# Patient Record
Sex: FEMALE | Race: WHITE | Employment: FULL TIME | URBAN - METROPOLITAN AREA
[De-identification: names, ages, dates, MRNs, and addresses within clinical notes are randomized per-mention and may not be internally consistent; named-entity substitution may affect disease eponyms.]

---

## 2017-02-03 ENCOUNTER — OFFICE VISIT (OUTPATIENT)
Dept: FAMILY MEDICINE CLINIC | Age: 47
End: 2017-02-03

## 2017-02-03 VITALS
WEIGHT: 157.6 LBS | OXYGEN SATURATION: 96 % | RESPIRATION RATE: 17 BRPM | DIASTOLIC BLOOD PRESSURE: 100 MMHG | HEART RATE: 66 BPM | BODY MASS INDEX: 26.91 KG/M2 | TEMPERATURE: 98.5 F | SYSTOLIC BLOOD PRESSURE: 130 MMHG | HEIGHT: 64 IN

## 2017-02-03 DIAGNOSIS — I26.99 OTHER PULMONARY EMBOLISM WITHOUT ACUTE COR PULMONALE, UNSPECIFIED CHRONICITY (HCC): ICD-10-CM

## 2017-02-03 DIAGNOSIS — J40 BRONCHITIS: Primary | ICD-10-CM

## 2017-02-03 DIAGNOSIS — R04.2 BLOOD IN SPUTUM: ICD-10-CM

## 2017-02-03 LAB
INR BLD: 3.1
PT POC: 38.3 SEC
VALID INTERNAL CONTROL?: YES

## 2017-02-03 RX ORDER — LORAZEPAM 1 MG/1
TABLET ORAL
Refills: 5 | COMMUNITY
Start: 2017-01-02 | End: 2017-05-15 | Stop reason: ALTCHOICE

## 2017-02-03 RX ORDER — PREDNISONE 10 MG/1
TABLET ORAL
Qty: 1 PACKAGE | Refills: 0 | Status: SHIPPED | OUTPATIENT
Start: 2017-02-03 | End: 2018-05-30 | Stop reason: ALTCHOICE

## 2017-02-03 RX ORDER — FLUCONAZOLE 150 MG/1
150 TABLET ORAL DAILY
Qty: 2 TAB | Refills: 0 | Status: SHIPPED | OUTPATIENT
Start: 2017-02-03 | End: 2017-02-04

## 2017-02-03 RX ORDER — CEFDINIR 300 MG/1
300 CAPSULE ORAL 2 TIMES DAILY
Qty: 20 CAP | Refills: 0 | Status: SHIPPED | OUTPATIENT
Start: 2017-02-03 | End: 2017-02-13

## 2017-02-03 NOTE — MR AVS SNAPSHOT
Visit Information Date & Time Provider Department Dept. Phone Encounter #  
 2/3/2017  2:00 PM Elizabeth Dickens NP [address] [facility] [phone] [id number] Follow-up Instructions Return if symptoms worsen or fail to improve. Upcoming Health Maintenance Date Due DTaP/Tdap/Td series (1 - Tdap) 6/9/1991 PAP AKA CERVICAL CYTOLOGY 6/9/1991 INFLUENZA AGE 9 TO ADULT 8/1/2016 Allergies as of 2/3/2017  Review Complete On: 2/3/2017 By: Elizabeth Dickens NP Severity Noted Reaction Type Reactions Tetanus Immune Globulin  10/08/2013    Swelling Current Immunizations  Never Reviewed No immunizations on file. Not reviewed this visit You Were Diagnosed With   
  
 Codes Comments Bronchitis    -  Primary ICD-10-CM: C36 ICD-9-CM: 603 Other pulmonary embolism without acute cor pulmonale, unspecified chronicity (HCC)     ICD-10-CM: I26.99 
ICD-9-CM: 415.19 Blood in sputum     ICD-10-CM: R04.2 ICD-9-CM: 786.30 Vitals BP Pulse Temp Resp Height(growth percentile) Weight(growth percentile) (!) 130/100 66 98.5 °F (36.9 °C) (Oral) 17 5' 4\" (1.626 m) 157 lb 9.6 oz (71.5 kg) SpO2 BMI OB Status Smoking Status 96% 27.05 kg/m2 Ablation Never Smoker Vitals History BMI and BSA Data Body Mass Index Body Surface Area  
 27.05 kg/m 2 1.8 m 2 Preferred Pharmacy Pharmacy Name Phone Scot Moran [address] [facility] [address] [facility] [phone] Your Updated Medication List  
  
   
This list is accurate as of: 2/3/17  2:32 PM.  Always use your most recent med list.  
  
  
  
  
 * albuterol sulfate 2.5 mg/0.5 mL Nebu nebulizer solution Commonly known as:  PROVENTIL;VENTOLIN  
0.5 mL by Nebulization route every four (4) hours as needed for Wheezing. * albuterol 90 mcg/actuation inhaler Commonly known as:  PROVENTIL HFA, VENTOLIN HFA, PROAIR HFA Take 2 Puffs by inhalation every four (4) hours as needed for Wheezing or Shortness of Breath. ALPRAZolam 0.5 mg tablet Commonly known as:  XANAX  
TAKE ONE TABLET BY MOUTH DAILY AS NEEDED FOR ANXIETY. Must last 30 days  
  
 budesonide-formoterol 160-4.5 mcg/actuation HFA inhaler Commonly known as:  SYMBICORT Take 2 Puffs by inhalation two (2) times a day. cefdinir 300 mg capsule Commonly known as:  OMNICEF Take 1 Cap by mouth two (2) times a day for 10 days. eszopiclone 3 mg tablet Commonly known as:  Rain Bliss Take 1 Tab by mouth nightly. Max Daily Amount: 3 mg. fluconazole 150 mg tablet Commonly known as:  DIFLUCAN Take 1 Tab by mouth daily for 1 day. May repeat in 3 days if needed  
  
 gabapentin 300 mg capsule Commonly known as:  NEURONTIN Take 300 mg by mouth daily. lisinopril 20 mg tablet Commonly known as:  Allyson Harry Take 1 Tab by mouth daily. LORazepam 1 mg tablet Commonly known as:  ATIVAN TK 1 T PO BID PRA  
  
 methadone 10 mg tablet Commonly known as:  DOLOPHINE Take 5.5 Tabs by mouth daily. Max Daily Amount: 55 mg.  
  
 omeprazole 40 mg capsule Commonly known as:  PRILOSEC Take 1 Cap by mouth daily. ondansetron 4 mg disintegrating tablet Commonly known as:  ZOFRAN ODT Take 1 Tab by mouth every eight (8) hours as needed for Nausea. predniSONE 10 mg dose pack Commonly known as:  STERAPRED DS Use as directed on packaging x 6 days * warfarin 10 mg tablet Commonly known as:  COUMADIN Take 1 Tab by mouth daily. * warfarin 5 mg tablet Commonly known as:  COUMADIN Take 1 Tab by mouth daily. * Notice: This list has 4 medication(s) that are the same as other medications prescribed for you. Read the directions carefully, and ask your doctor or other care provider to review them with you. Prescriptions Sent to Pharmacy Refills  
 cefdinir (OMNICEF) 300 mg capsule 0  Sig: Take 1 Cap by mouth two (2) times a day for 10 days. Class: Normal  
 Pharmacy: [facility] [address], [address] Ph #: [phone] Route: Oral  
 predniSONE (STERAPRED DS) 10 mg dose pack 0 Sig: Use as directed on packaging x 6 days Class: Normal  
 Pharmacy: [facility] [address], [address] Ph #: [phone]  
 fluconazole (DIFLUCAN) 150 mg tablet 0 Sig: Take 1 Tab by mouth daily for 1 day. May repeat in 3 days if needed Class: Normal  
 Pharmacy: [facility] [address], [address] Ph #: [phone] Route: Oral  
  
Follow-up Instructions Return if symptoms worsen or fail to improve. Patient Instructions Bronchitis: Care Instructions Your Care Instructions Bronchitis is inflammation of the bronchial tubes, which carry air to the lungs. The tubes swell and produce mucus, or phlegm. The mucus and inflamed bronchial tubes make you cough. You may have trouble breathing. Most cases of bronchitis are caused by viruses like those that cause colds. Antibiotics usually do not help and they may be harmful. Bronchitis usually develops rapidly and lasts about 2 to 3 weeks in otherwise healthy people. Follow-up care is a key part of your treatment and safety. Be sure to make and go to all appointments, and call your doctor if you are having problems. It's also a good idea to know your test results and keep a list of the medicines you take. How can you care for yourself at home? · Take all medicines exactly as prescribed. Call your doctor if you think you are having a problem with your medicine. · Get some extra rest. 
· Take an over-the-counter pain medicine, such as acetaminophen (Tylenol), ibuprofen (Advil, Motrin), or naproxen (Aleve) to reduce fever and relieve body aches. Read and follow all instructions on the label.  
· Do not take two or more pain medicines at the same time unless the doctor told you to. Many pain medicines have acetaminophen, which is Tylenol. Too much acetaminophen (Tylenol) can be harmful. · Take an over-the-counter cough medicine that contains dextromethorphan to help quiet a dry, hacking cough so that you can sleep. Avoid cough medicines that have more than one active ingredient. Read and follow all instructions on the label. · Breathe moist air from a humidifier, hot shower, or sink filled with hot water. The heat and moisture will thin mucus so you can cough it out. · Do not smoke. Smoking can make bronchitis worse. If you need help quitting, talk to your doctor about stop-smoking programs and medicines. These can increase your chances of quitting for good. When should you call for help? Call 911 anytime you think you may need emergency care. For example, call if: 
· You have severe trouble breathing. Call your doctor now or seek immediate medical care if: 
· You have new or worse trouble breathing. · You cough up dark brown or bloody mucus (sputum). · You have a new or higher fever. · You have a new rash. Watch closely for changes in your health, and be sure to contact your doctor if: 
· You cough more deeply or more often, especially if you notice more mucus or a change in the color of your mucus. · You are not getting better as expected. Where can you learn more? Go to http://javi-lubna.info/. Enter H333 in the search box to learn more about \"Bronchitis: Care Instructions. \" Current as of: May 23, 2016 Content Version: 11.1 © 3541-1160 Healthwise, Incorporated. Care instructions adapted under license by [employer] (which disclaims liability or warranty for this information). If you have questions about a medical condition or this instruction, always ask your healthcare professional. [place] [address] any warranty or liability for your use of this information. Introducing [facility]!    
 Teresa Ventura introduces MobileSnack patient portal. Now you can access parts of your medical record, email your doctor's office, and request medication refills online. 1. In your internet browser, go to [email or web address] 2. Click on the First Time User? Click Here link in the Sign In box. You will see the New Member Sign Up page. 3. Enter your MobileSnack Access Code exactly as it appears below. You will not need to use this code after youve completed the sign-up process. If you do not sign up before the expiration date, you must request a new code. · MobileSnack Access Code: [email or web address] Expires: 3/8/2017 10:08 AM 
 
4. Enter the last four digits of your Social Security Number (xxxx) and Date of Birth (mm/dd/yyyy) as indicated and click Submit. You will be taken to the next sign-up page. 5. Create a MobileSnack ID. This will be your MobileSnack login ID and cannot be changed, so think of one that is secure and easy to remember. 6. Create a MobileSnack password. You can change your password at any time. 7. Enter your Password Reset Question and Answer. This can be used at a later time if you forget your password. 8. Enter your e-mail address. You will receive e-mail notification when new information is available in [address] 9. Click Sign Up. You can now view and download portions of your medical record. 10. Click the Download Summary menu link to download a portable copy of your medical information. If you have questions, please visit the Frequently Asked Questions section of the MobileSnack website. Remember, MobileSnack is NOT to be used for urgent needs. For medical emergencies, dial 911. Now available from your iPhone and Android! Please provide this summary of care documentation to your next provider. Your primary care clinician is listed as Albert Paris. If you have any questions after today's visit, please call [phone].

## 2017-02-03 NOTE — PROGRESS NOTES
Chief Complaint   Patient presents with    Cough     Blood tinged mucus     Sore Throat     Scratchy    Nasal Congestion     QHS     Patient seen in office today c/o of severe nasal congestion at [facility].    She reports having a scratchy throat for apr 3 wks  Patient concerned with constantly coughing up moderate amts of blood tinged mucus frequently  Has not tried OTC rx, states she is unable, related to current disease, left unspecified

## 2017-02-03 NOTE — PATIENT INSTRUCTIONS
Bronchitis: Care Instructions  Your Care Instructions    Bronchitis is inflammation of the bronchial tubes, which carry air to the lungs. The tubes swell and produce mucus, or phlegm. The mucus and inflamed bronchial tubes make you cough. You may have trouble breathing. Most cases of bronchitis are caused by viruses like those that cause colds. Antibiotics usually do not help and they may be harmful. Bronchitis usually develops rapidly and lasts about 2 to 3 weeks in otherwise healthy people. Follow-up care is a key part of your treatment and safety. Be sure to make and go to all appointments, and call your doctor if you are having problems. It's also a good idea to know your test results and keep a list of the medicines you take. How can you care for yourself at home? · Take all medicines exactly as prescribed. Call your doctor if you think you are having a problem with your medicine. · Get some extra rest.  · Take an over-the-counter pain medicine, such as acetaminophen (Tylenol), ibuprofen (Advil, Motrin), or naproxen (Aleve) to reduce fever and relieve body aches. Read and follow all instructions on the label. · Do not take two or more pain medicines at the same time unless the doctor told you to. Many pain medicines have acetaminophen, which is Tylenol. Too much acetaminophen (Tylenol) can be harmful. · Take an over-the-counter cough medicine that contains dextromethorphan to help quiet a dry, hacking cough so that you can sleep. Avoid cough medicines that have more than one active ingredient. Read and follow all instructions on the label. · Breathe moist air from a humidifier, hot shower, or sink filled with hot water. The heat and moisture will thin mucus so you can cough it out. · Do not smoke. Smoking can make bronchitis worse. If you need help quitting, talk to your doctor about stop-smoking programs and medicines. These can increase your chances of quitting for good.   When should you call for help? Call 911 anytime you think you may need emergency care. For example, call if:  · You have severe trouble breathing. Call your doctor now or seek immediate medical care if:  · You have new or worse trouble breathing. · You cough up dark brown or bloody mucus (sputum). · You have a new or higher fever. · You have a new rash. Watch closely for changes in your health, and be sure to contact your doctor if:  · You cough more deeply or more often, especially if you notice more mucus or a change in the color of your mucus. · You are not getting better as expected. Where can you learn more? Go to http://javi-lubna.info/. Enter H333 in the search box to learn more about \"Bronchitis: Care Instructions. \"  Current as of: May 23, 2016  Content Version: 11.1  © 8649-8998 [employer], Incorporated. Care instructions adapted under license by [employer] (which disclaims liability or warranty for this information). If you have questions about a medical condition or this instruction, always ask your healthcare professional. [address] any warranty or liability for your use of this information.

## 2017-02-03 NOTE — PROGRESS NOTES
Chief Complaint   Patient presents with    Cough     Blood tinged mucus     Sore Throat     Scratchy    Nasal Congestion     QHS     she is a 55y.o. year old female who presents for evalution. Pt states for past few weeks has been having congestion, sore throat. Last night started having coughing with bloody mucus. No fever or chills. Has not tried any OTCs aside from Benadryl at bedtime. No chest pain, no SOB, no wheezing. Pt is on Coumadin, takes regularly. Last PT/INR check in early Dec.     Pt states when takes Lisinopril and methadone will feel very dizzy and like BP is too low. Has not taken BP medication in past week. Does not monitor BP at home. Reviewed PmHx, RxHx, FmHx, SocHx, AllgHx and updated and dated in the chart. Review of Systems - negative except as listed above in the HPI    Objective:     Vitals:    02/03/17 1349 02/03/17 1424 02/03/17 1425   BP: (!) 98/95 (!) 130/100 (!) 130/100   Pulse: 66     Resp: 17     Temp: 98.5 °F (36.9 °C)     TempSrc: Oral     SpO2: 96%     Weight: 157 lb 9.6 oz (71.5 kg)     Height: 5' 4\" (1.626 m)       Physical Examination: General appearance - alert, well appearing, and in no distress  Ears - bilateral TM's and external ear canals normal  Nose - normal nontender sinuses, mucosal congestion and mucosal erythema  Mouth - mucous membranes moist, pharynx normal without lesions and erythematous  Neck - supple, no significant adenopathy  Chest - clear to auscultation, no wheezes, rales or rhonchi, symmetric air entry, coarse breath sounds   Heart - normal rate, regular rhythm, normal S1, S2, no murmurs, rubs, clicks or gallops    Assessment/ Plan:   Brooke Rogers was seen today for cough, sore throat and nasal congestion. Diagnoses and all orders for this visit:    Bronchitis  -     cefdinir (OMNICEF) 300 mg capsule; Take 1 Cap by mouth two (2) times a day for 10 days.   -     predniSONE (STERAPRED DS) 10 mg dose pack; Use as directed on packaging x 6 days  -     fluconazole (DIFLUCAN) 150 mg tablet; Take 1 Tab by mouth daily for 1 day. May repeat in 3 days if needed  New rxs. Discussed and encouraged rest and clear fluids, if congestion is thick should avoid dairy. Recommended hot showers with steam and elevating head of bed. May use Vitamin C or Echinacea in addition to current therapy. Other pulmonary embolism without acute cor pulmonale, unspecified chronicity (HCC)  -     AMB POC PT/INR  Slightly above goal, with additional bleeding in sputum take 5mg today instead of normal 10mg dose. Blood in sputum  -     AMB POC PT/INR    Pt voiced understanding regarding plan of care. Follow-up Disposition:  Return if symptoms worsen or fail to improve. I have discussed the diagnosis with the patient and the intended plan as seen in the above orders. The patient has received an after-visit summary and questions were answered concerning future plans.      Medication Side Effects and Warnings were discussed with patient    Ciara Weiner NP

## 2017-03-10 ENCOUNTER — TELEPHONE (OUTPATIENT)
Dept: FAMILY MEDICINE CLINIC | Age: 47
End: 2017-03-10

## 2017-03-10 NOTE — TELEPHONE ENCOUNTER
----- Message from Antoine Sol sent at 3/10/2017  7:40 AM EST -----  Regarding: Dr. Pola Rape Rhumel[facility]) would like a call from the doctor concerning pt's medication(Xanax) that has been prescribed by the doctor for several mths. Best contact number [phone].

## 2017-03-10 NOTE — TELEPHONE ENCOUNTER
Call from pt's addiction counselor pt has reported to them that she is filling but not taking any of her controlled substances, \"just doesn't want them to stop being prescribed\". Pt is getting ativan and pain medication from another doctor. I recommend discontinuing prescribing controlled substances until follow up.

## 2017-03-16 ENCOUNTER — DOCUMENTATION ONLY (OUTPATIENT)
Dept: FAMILY MEDICINE CLINIC | Age: 47
End: 2017-03-16

## 2017-05-03 ENCOUNTER — OFFICE VISIT (OUTPATIENT)
Dept: FAMILY MEDICINE CLINIC | Age: 47
End: 2017-05-03

## 2017-05-03 ENCOUNTER — HOSPITAL ENCOUNTER (OUTPATIENT)
Dept: LAB | Age: 47
Discharge: HOME | End: 2017-05-03
Payer: COMMERCIAL

## 2017-05-03 VITALS
HEIGHT: 64 IN | WEIGHT: 142 LBS | SYSTOLIC BLOOD PRESSURE: 130 MMHG | TEMPERATURE: 98.4 F | DIASTOLIC BLOOD PRESSURE: 98 MMHG | HEART RATE: 78 BPM | RESPIRATION RATE: 16 BRPM | OXYGEN SATURATION: 99 % | BODY MASS INDEX: 24.24 KG/M2

## 2017-05-03 DIAGNOSIS — N83.202 CYSTS OF BOTH OVARIES: ICD-10-CM

## 2017-05-03 DIAGNOSIS — N83.201 CYSTS OF BOTH OVARIES: ICD-10-CM

## 2017-05-03 DIAGNOSIS — K59.09 CHRONIC CONSTIPATION: ICD-10-CM

## 2017-05-03 DIAGNOSIS — Z12.31 VISIT FOR SCREENING MAMMOGRAM: ICD-10-CM

## 2017-05-03 DIAGNOSIS — Z00.00 WELL ADULT EXAM: Primary | ICD-10-CM

## 2017-05-03 DIAGNOSIS — Z01.419 ENCOUNTER FOR CERVICAL PAP SMEAR WITH PELVIC EXAM: ICD-10-CM

## 2017-05-03 DIAGNOSIS — Z80.3 FH: BREAST CANCER: ICD-10-CM

## 2017-05-03 PROCEDURE — 87625 HPV TYPES 16 & 18 ONLY: CPT | Performed by: NURSE PRACTITIONER

## 2017-05-03 PROCEDURE — 88175 CYTOPATH C/V AUTO FLUID REDO: CPT | Performed by: NURSE PRACTITIONER

## 2017-05-03 PROCEDURE — 87624 HPV HI-RISK TYP POOLED RSLT: CPT | Performed by: NURSE PRACTITIONER

## 2017-05-03 RX ORDER — HYDROCODONE BITARTRATE AND ACETAMINOPHEN 10; 325 MG/1; MG/1
TABLET ORAL
Refills: 0 | COMMUNITY
Start: 2017-02-22 | End: 2017-05-15 | Stop reason: ALTCHOICE

## 2017-05-03 NOTE — PROGRESS NOTES
1. Have you been to the ER, urgent care clinic since your last visit? Hospitalized since your last visit? No    2. Have you seen or consulted any other health care providers outside of the [address] [place] [address] since your last visit? Include any pap smears or colon screening.  No    Chief Complaint   Patient presents with    Complete Physical     w/ PAP    Labs     fasting

## 2017-05-03 NOTE — LETTER
5/5/2017 10:56 AM 
 
Ms. Lizama  [address] [address] [place] [address] Dear Alda Pro: **We have not been able to reach you by phone because the number we have on file [phone] is not in service. Please see recommendations at the bottom of this letter and call the office at [phone] to schedule an appointment to recheck labs. ** 
 
Resulted Orders CBC WITH AUTOMATED DIFF Result Value Ref Range WBC 11.9 (H) 3.4 - 10.8 x10E3/uL  
 RBC 4.61 3.77 - 5.28 x10E6/uL HGB 13.1 11.1 - 15.9 g/dL HCT 41.0 34.0 - 46.6 % MCV 89 79 - 97 fL  
 MCH 28.4 26.6 - 33.0 pg  
 MCHC 32.0 31.5 - 35.7 g/dL  
 RDW 14.7 12.3 - 15.4 % PLATELET 337 903 - 854 x10E3/uL NEUTROPHILS 70 % Lymphocytes 20 % MONOCYTES 8 % EOSINOPHILS 2 % BASOPHILS 0 %  
 ABS. NEUTROPHILS 8.2 (H) 1.4 - 7.0 x10E3/uL Abs Lymphocytes 2.4 0.7 - 3.1 x10E3/uL  
 ABS. MONOCYTES 1.0 (H) 0.1 - 0.9 x10E3/uL  
 ABS. EOSINOPHILS 0.3 0.0 - 0.4 x10E3/uL  
 ABS. BASOPHILS 0.0 0.0 - 0.2 x10E3/uL IMMATURE GRANULOCYTES 0 %  
 ABS. IMM. GRANS. 0.0 0.0 - 0.1 x10E3/uL Narrative Performed at:  [address]  [id number] : Linder Lanes MD, Phone:  [phone] LIPID PANEL Result Value Ref Range Cholesterol, total 179 100 - 199 mg/dL Triglyceride 168 (H) 0 - 149 mg/dL HDL Cholesterol 41 >39 mg/dL VLDL, calculated 34 5 - 40 mg/dL LDL, calculated 104 (H) 0 - 99 mg/dL Narrative Performed at:  [address]  [id number] : Linder Lanes MD, Phone:  [phone] TSH 3RD GENERATION Result Value Ref Range TSH 1.370 0.450 - 4.500 uIU/mL Narrative Performed at:  [address]  [id number] : Linder Lanes MD, Phone:  [phone] METABOLIC PANEL, COMPREHENSIVE Result Value Ref Range Glucose 80 65 - 99 mg/dL  BUN 3 (L) 6 - 24 mg/dL Creatinine 0.60 0.57 - 1.00 mg/dL GFR est non- >59 mL/min/1.73 GFR est  >59 mL/min/1.73  
 BUN/Creatinine ratio 5 (L) 9 - 23 Sodium 141 134 - 144 mmol/L Potassium 3.2 (L) 3.5 - 5.2 mmol/L Chloride 99 96 - 106 mmol/L  
 CO2 26 18 - 29 mmol/L Calcium 8.8 8.7 - 10.2 mg/dL Protein, total 6.4 6.0 - 8.5 g/dL Albumin 4.0 3.5 - 5.5 g/dL GLOBULIN, TOTAL 2.4 1.5 - 4.5 g/dL A-G Ratio 1.7 1.2 - 2.2 Bilirubin, total 0.4 0.0 - 1.2 mg/dL Alk. phosphatase 124 (H) 39 - 117 IU/L  
 AST (SGOT) 26 0 - 40 IU/L  
 ALT (SGPT) 23 0 - 32 IU/L Narrative Performed at:  [address]  [id number] : Jurgen Hayes MD, Phone:  [phone] CVD REPORT Result Value Ref Range INTERPRETATION Note Comment:  
   Supplement report is available. Narrative Performed at:  [address] [place] [address] [address] [place] [address]  [id number] : Nettie Gunderson PhD, Phone:  [phone] RECOMMENDATIONS: 
Please let her know labs overall look great but her potassium is low.  Needs to add an over the counter potassium supplement once a day to get this level up. Otherwise no changes in meds at this time, recheck potassium in 1 month. Nestor Puga Please call me if you have any questions: [phone] Sincerely, 
 
 
Christine Shaver NP

## 2017-05-03 NOTE — PROGRESS NOTES
Subjective:   55 y.o. female for Well Woman Check. Patient's last menstrual period was 07/01/2010. Social History: single partner, contraception - none. Pertinent past medical hstory: no history of HTN, DVT, CAD, DM, liver disease, migraines or smoking. She would like to see breast specialists: 6 aunts and an uncle and her mother all had breast or cervical cancer. Patient Active Problem List    Diagnosis Date Noted    Carotid artery stenosis 07/27/2015    Takayasu's disease ([facility] 07/10/2015    Aneurysm of other specified artery 07/10/2015    [facility] ([facility] 07/10/2015    Peripheral neuropathy ([facility] 07/10/2015    Bipolar I disorder, most recent episode (or current) unspecified 07/10/2015    Gastroesophageal reflux disease without esophagitis 07/10/2015    Benign essential hypertension 07/10/2015    Insomnia 07/10/2015     Current Outpatient Prescriptions   Medication Sig Dispense Refill    HYDROcodone-acetaminophen (NORCO)  mg tablet TAKE 1 TABLET BY MOUTH EVERY 4 HOURS AS NEEDED FOR PAIN  0    linaclotide (LINZESS) 145 mcg cap capsule Take 1 Cap by mouth Daily (before breakfast). 30 Cap 5    LORazepam (ATIVAN) 1 mg tablet TK 1 T PO BID PRA  5    warfarin (COUMADIN) 10 mg tablet Take 1 Tab by mouth daily. 30 Tab 5    warfarin (COUMADIN) 5 mg tablet Take 1 Tab by mouth daily. 30 Tab 5    lisinopril (PRINIVIL, ZESTRIL) 20 mg tablet Take 1 Tab by mouth daily. 30 Tab 5    omeprazole (PRILOSEC) 40 mg capsule Take 1 Cap by mouth daily. 30 Cap 5    ALPRAZolam (XANAX) 0.5 mg tablet TAKE ONE TABLET BY MOUTH DAILY AS NEEDED FOR ANXIETY. Must last 30 days 30 Tab 2    ondansetron (ZOFRAN ODT) 4 mg disintegrating tablet Take 1 Tab by mouth every eight (8) hours as needed for Nausea. 20 Tab 2    methadone (DOLOPHINE) 10 mg tablet Take 5.5 Tabs by mouth daily. Max Daily Amount: 55 mg.  0    eszopiclone (LUNESTA) 3 mg tablet Take 1 Tab by mouth nightly.  Max Daily Amount: 3 mg. 30 Tab 1  albuterol (PROVENTIL HFA, VENTOLIN HFA, PROAIR HFA) 90 mcg/actuation inhaler Take 2 Puffs by inhalation every four (4) hours as needed for Wheezing or Shortness of Breath. 1 Inhaler 2    gabapentin (NEURONTIN) 300 mg capsule Take 300 mg by mouth daily.  budesonide-formoterol (SYMBICORT) 160-4.5 mcg/actuation HFA inhaler Take 2 Puffs by inhalation two (2) times a day. 1 Inhaler 3    albuterol sulfate (PROVENTIL;VENTOLIN) 2.5 mg/0.5 mL nebu nebulizer solution 0.5 mL by Nebulization route every four (4) hours as needed for Wheezing. 1 Package 2    predniSONE (STERAPRED DS) 10 mg dose pack Use as directed on packaging x 6 days 1 Package 0     Family History   Problem Relation Age of Onset    Heart Disease Mother     Stroke Father     Heart Disease Maternal Grandmother     Stroke Paternal Grandmother     Cancer Paternal Grandmother      Social History   Substance Use Topics    Smoking status: Never Smoker    Smokeless tobacco: Never Used    Alcohol use No        ROS:  Feeling well. No dyspnea or chest pain on exertion. No abdominal pain, change in bowel habits, black or bloody stools. No urinary tract symptoms. GYN ROS: no breast pain or new or enlarging lumps on self exam, no vaginal bleeding, no discharge or pelvic pain. No neurological complaints. Objective:     Visit Vitals    BP (!) 130/98    Pulse 78    Temp 98.4 °F (36.9 °C) (Oral)    Resp 16    Ht 5' 4\" (1.626 m)    Wt 142 lb (64.4 kg)    LMP 07/01/2010    SpO2 99%    BMI 24.37 kg/m2     The patient appears well, alert, oriented x 3, in no distress. ENT normal.  Neck supple. No adenopathy or thyromegaly. STEPHAN. Lungs are clear, good air entry, no wheezes, rhonchi or rales. S1 and S2 normal, no murmurs, regular rate and rhythm. Abdomen soft without tenderness, guarding, mass or organomegaly. Extremities show no edema, normal peripheral pulses. Neurological is normal, no focal findings.     BREAST EXAM: breasts appear normal, no suspicious masses, no skin or nipple changes or axillary nodes    PELVIC EXAM: normal external genitalia, vulva, vagina, cervix, uterus and adnexa    Assessment/Plan:   well woman  mammogram  pap smear  additional lab tests per orders  return annually or prn  Encounter Diagnoses   Name Primary?  Well adult exam Yes    Encounter for cervical Pap smear with pelvic exam     Cysts of both ovaries     Visit for screening mammogram     FH: breast cancer     Chronic constipation      Orders Placed This Encounter    JADYN MAMMO BI SCREENING INCL CAD    US PELV NON OBS    CBC WITH AUTOMATED DIFF    LIPID PANEL    TSH 3RD GENERATION    METABOLIC PANEL, COMPREHENSIVE    REFERRAL TO BREAST SURGERY    linaclotide (LINZESS) 145 mcg cap capsule    PAP IG, HPV AND RFX HPV 95/32,76([id number])   . All labs updated today including pap  Start Linzess for chronic constipation  Given info to see Dr. Elaine Hooks, ordered US of pelvis for pmh of ovarian cysts    I have discussed the diagnosis with the patient and the intended plan as seen in the above orders. The patient has received an after-visit summary and questions were answered concerning future plans. Patient conveyed understanding of the plan at the time of the visit.     Lakesha Marcos, MSN, ANP  5/3/2017

## 2017-05-03 NOTE — MR AVS SNAPSHOT
Visit Information Date & Time Provider Department Dept. Phone Encounter #  
 5/3/2017  1:30 PM Valente Do NP [address] [facility] [phone] [id number] Upcoming Health Maintenance Date Due DTaP/Tdap/Td series (1 - Tdap) 6/9/1991 PAP AKA CERVICAL CYTOLOGY 6/9/1991 INFLUENZA AGE 9 TO ADULT 8/1/2017 Allergies as of 5/3/2017  Review Complete On: 5/3/2017 By: Ashley Zapata, WILLIAMS Severity Noted Reaction Type Reactions Tetanus Immune Globulin  10/08/2013    Swelling Current Immunizations  Never Reviewed No immunizations on file. Not reviewed this visit You Were Diagnosed With   
  
 Codes Comments Well adult exam    -  Primary ICD-10-CM: Z00.00 ICD-9-CM: V70.0 Encounter for cervical Pap smear with pelvic exam     ICD-10-CM: L83.327 ICD-9-CM: V76.2, V72.31 Cysts of both ovaries     ICD-10-CM: N83.201, R59.790 ICD-9-CM: 620.2 Visit for screening mammogram     ICD-10-CM: Z12.31 
ICD-9-CM: V76.12 FH: breast cancer     ICD-10-CM: Z80.3 ICD-9-CM: V16.3 Chronic constipation     ICD-10-CM: K59.09 
ICD-9-CM: 564.00 Vitals BP Pulse Temp Resp Height(growth percentile) Weight(growth percentile) (!) 130/98 78 98.4 °F (36.9 °C) (Oral) 16 5' 4\" (1.626 m) 142 lb (64.4 kg) LMP SpO2 BMI OB Status Smoking Status 07/01/2010 99% 24.37 kg/m2 Ablation Never Smoker Vitals History BMI and BSA Data Body Mass Index Body Surface Area  
 24.37 kg/m 2 1.71 m 2 Preferred Pharmacy Pharmacy Name Phone [facility] [address], [address] [place] [address] [facility] Manfred [phone] Your Updated Medication List  
  
   
This list is accurate as of: 5/3/17  2:07 PM.  Always use your most recent med list.  
  
  
  
  
 * albuterol sulfate 2.5 mg/0.5 mL Nebu nebulizer solution Commonly known as:  PROVENTIL;VENTOLIN  
0.5 mL by Nebulization route every four (4) hours as needed for Wheezing.   
  
 * albuterol 90 mcg/actuation inhaler Commonly known as:  PROVENTIL HFA, VENTOLIN HFA, PROAIR HFA Take 2 Puffs by inhalation every four (4) hours as needed for Wheezing or Shortness of Breath. ALPRAZolam 0.5 mg tablet Commonly known as:  XANAX  
TAKE ONE TABLET BY MOUTH DAILY AS NEEDED FOR ANXIETY. Must last 30 days  
  
 budesonide-formoterol 160-4.5 mcg/actuation HFA inhaler Commonly known as:  SYMBICORT Take 2 Puffs by inhalation two (2) times a day. eszopiclone 3 mg tablet Commonly known as:  Peggi Hubbard Take 1 Tab by mouth nightly. Max Daily Amount: 3 mg.  
  
 gabapentin 300 mg capsule Commonly known as:  NEURONTIN Take 300 mg by mouth daily. HYDROcodone-acetaminophen  mg tablet Commonly known as:  NORCO  
TAKE 1 TABLET BY MOUTH EVERY 4 HOURS AS NEEDED FOR PAIN  
  
 linaclotide 145 mcg Cap capsule Commonly known as:  Minor Funmi Take 1 Cap by mouth Daily (before breakfast). lisinopril 20 mg tablet Commonly known as:  Dorette Ovidio Take 1 Tab by mouth daily. LORazepam 1 mg tablet Commonly known as:  ATIVAN TK 1 T PO BID PRA  
  
 methadone 10 mg tablet Commonly known as:  DOLOPHINE Take 5.5 Tabs by mouth daily. Max Daily Amount: 55 mg.  
  
 omeprazole 40 mg capsule Commonly known as:  PRILOSEC Take 1 Cap by mouth daily. ondansetron 4 mg disintegrating tablet Commonly known as:  ZOFRAN ODT Take 1 Tab by mouth every eight (8) hours as needed for Nausea. predniSONE 10 mg dose pack Commonly known as:  STERAPRED DS Use as directed on packaging x 6 days * warfarin 10 mg tablet Commonly known as:  COUMADIN Take 1 Tab by mouth daily. * warfarin 5 mg tablet Commonly known as:  COUMADIN Take 1 Tab by mouth daily. * Notice: This list has 4 medication(s) that are the same as other medications prescribed for you.  Read the directions carefully, and ask your doctor or other care provider to review them with you.  
  
  
  
Prescriptions Sent to Pharmacy Refills  
 linaclotide (LINZESS) 145 mcg cap capsule 5 Sig: Take 1 Cap by mouth Daily (before breakfast). Class: Normal  
 Pharmacy: [facility], [address] #: [phone] Route: Oral  
  
We Performed the Following CBC WITH AUTOMATED DIFF [60039 CPT(R)] LIPID PANEL [56538 CPT(R)] METABOLIC PANEL, COMPREHENSIVE [16872 CPT(R)] REFERRAL TO BREAST SURGERY [REF10 Custom] Comments:  
 Please evaluate patient for fhx of breast cancer that is extensive and genetic counseling. TSH 3RD GENERATION [77734 CPT(R)] To-Do List   
 05/10/2017 Imaging:  US PELV NON OBS   
  
 05/31/2017 Imaging:  JADYN MAMMO BI SCREENING INCL CAD Referral Information Referral ID Referred By Referred To  
  
 [id number] JIGAR Bejarano MD [place], [address] Phone: [phone] Fax: [phone] Visits Status Start Date End Date 1 New Request 5/3/17 5/3/18 If your referral has a status of pending review or denied, additional information will be sent to support the outcome of this decision. Introducing [facility]! Eusebio Campbell introduces Playviews patient portal. Now you can access parts of your medical record, email your doctor's office, and request medication refills online. 1. In your internet browser, go to [email or web address] 2. Click on the First Time User? Click Here link in the Sign In box. You will see the New Member Sign Up page. 3. Enter your Playviews Access Code exactly as it appears below. You will not need to use this code after youve completed the sign-up process. If you do not sign up before the expiration date, you must request a new code. · Playviews Access Code: [email or web address] Expires: 8/1/2017  1:22 PM 
 
4.  Enter the last four digits of your Social Security Number (xxxx) and Date of Birth (mm/dd/yyyy) as indicated and click Submit. You will be taken to the next sign-up page. 5. Create a BitPostert ID. This will be your SHARKMARX login ID and cannot be changed, so think of one that is secure and easy to remember. 6. Create a SHARKMARX password. You can change your password at any time. 7. Enter your Password Reset Question and Answer. This can be used at a later time if you forget your password. 8. Enter your e-mail address. You will receive e-mail notification when new information is available in [address] 9. Click Sign Up. You can now view and download portions of your medical record. 10. Click the Download Summary menu link to download a portable copy of your medical information. If you have questions, please visit the Frequently Asked Questions section of the SHARKMARX website. Remember, SHARKMARX is NOT to be used for urgent needs. For medical emergencies, dial 911. Now available from your iPhone and Android! Please provide this summary of care documentation to your next provider. Your primary care clinician is listed as Justino Conroy. If you have any questions after today's visit, please call [phone].

## 2017-05-04 LAB
ALBUMIN SERPL-MCNC: 4 G/DL (ref 3.5–5.5)
ALBUMIN/GLOB SERPL: 1.7 {RATIO} (ref 1.2–2.2)
ALP SERPL-CCNC: 124 IU/L (ref 39–117)
ALT SERPL-CCNC: 23 IU/L (ref 0–32)
AST SERPL-CCNC: 26 IU/L (ref 0–40)
BASOPHILS # BLD AUTO: 0 X10E3/UL (ref 0–0.2)
BASOPHILS NFR BLD AUTO: 0 %
BILIRUB SERPL-MCNC: 0.4 MG/DL (ref 0–1.2)
BUN SERPL-MCNC: 3 MG/DL (ref 6–24)
BUN/CREAT SERPL: 5 (ref 9–23)
CALCIUM SERPL-MCNC: 8.8 MG/DL (ref 8.7–10.2)
CHLORIDE SERPL-SCNC: 99 MMOL/L (ref 96–106)
CHOLEST SERPL-MCNC: 179 MG/DL (ref 100–199)
CO2 SERPL-SCNC: 26 MMOL/L (ref 18–29)
CREAT SERPL-MCNC: 0.6 MG/DL (ref 0.57–1)
EOSINOPHIL # BLD AUTO: 0.3 X10E3/UL (ref 0–0.4)
EOSINOPHIL NFR BLD AUTO: 2 %
ERYTHROCYTE [DISTWIDTH] IN BLOOD BY AUTOMATED COUNT: 14.7 % (ref 12.3–15.4)
GLOBULIN SER CALC-MCNC: 2.4 G/DL (ref 1.5–4.5)
GLUCOSE SERPL-MCNC: 80 MG/DL (ref 65–99)
HCT VFR BLD AUTO: 41 % (ref 34–46.6)
HDLC SERPL-MCNC: 41 MG/DL
HGB BLD-MCNC: 13.1 G/DL (ref 11.1–15.9)
IMM GRANULOCYTES # BLD: 0 X10E3/UL (ref 0–0.1)
IMM GRANULOCYTES NFR BLD: 0 %
INTERPRETATION, 910389: NORMAL
LDLC SERPL CALC-MCNC: 104 MG/DL (ref 0–99)
LYMPHOCYTES # BLD AUTO: 2.4 X10E3/UL (ref 0.7–3.1)
LYMPHOCYTES NFR BLD AUTO: 20 %
MCH RBC QN AUTO: 28.4 PG (ref 26.6–33)
MCHC RBC AUTO-ENTMCNC: 32 G/DL (ref 31.5–35.7)
MCV RBC AUTO: 89 FL (ref 79–97)
MONOCYTES # BLD AUTO: 1 X10E3/UL (ref 0.1–0.9)
MONOCYTES NFR BLD AUTO: 8 %
NEUTROPHILS # BLD AUTO: 8.2 X10E3/UL (ref 1.4–7)
NEUTROPHILS NFR BLD AUTO: 70 %
PLATELET # BLD AUTO: 360 X10E3/UL (ref 150–379)
POTASSIUM SERPL-SCNC: 3.2 MMOL/L (ref 3.5–5.2)
PROT SERPL-MCNC: 6.4 G/DL (ref 6–8.5)
RBC # BLD AUTO: 4.61 X10E6/UL (ref 3.77–5.28)
SODIUM SERPL-SCNC: 141 MMOL/L (ref 134–144)
TRIGL SERPL-MCNC: 168 MG/DL (ref 0–149)
TSH SERPL DL<=0.005 MIU/L-ACNC: 1.37 UIU/ML (ref 0.45–4.5)
VLDLC SERPL CALC-MCNC: 34 MG/DL (ref 5–40)
WBC # BLD AUTO: 11.9 X10E3/UL (ref 3.4–10.8)

## 2017-05-05 NOTE — PROGRESS NOTES
Please let her know labs overall look great but her K is low. Needs to add an over the counter potassium supplement once a day to get this level up. Otherwise no changes in meds at this time, recheck K in 1 month.  Yelitza Clayton

## 2017-05-05 NOTE — PROGRESS NOTES
Called [phone] (M) and number is not in service. Sent result letter w/ unable to reach you note.

## 2017-05-11 RX ORDER — METRONIDAZOLE 500 MG/1
500 TABLET ORAL 2 TIMES DAILY
Qty: 14 TAB | Refills: 0 | Status: SHIPPED | OUTPATIENT
Start: 2017-05-11 | End: 2017-05-18

## 2017-05-11 NOTE — PROGRESS NOTES
Your pap smear is normal however you do have a bacterial vaginal infection. I have sent in Flagyl to treat this, one pill twice a day for 7 days. Recheck pap in 6 months to make sure the inflammation has calmed down and resolved.  Ever Mcintyre

## 2017-05-15 ENCOUNTER — OFFICE VISIT (OUTPATIENT)
Dept: SURGERY | Age: 47
End: 2017-05-15

## 2017-05-15 ENCOUNTER — DOCUMENTATION ONLY (OUTPATIENT)
Dept: SURGERY | Age: 47
End: 2017-05-15

## 2017-05-15 ENCOUNTER — TELEPHONE (OUTPATIENT)
Dept: SURGERY | Age: 47
End: 2017-05-15

## 2017-05-15 VITALS
SYSTOLIC BLOOD PRESSURE: 127 MMHG | BODY MASS INDEX: 23.9 KG/M2 | DIASTOLIC BLOOD PRESSURE: 64 MMHG | WEIGHT: 140 LBS | HEART RATE: 71 BPM | HEIGHT: 64 IN

## 2017-05-15 DIAGNOSIS — Z80.3 FAMILY HISTORY OF BREAST CANCER: Primary | ICD-10-CM

## 2017-05-15 RX ORDER — CARBAMAZEPINE 200 MG/1
350 TABLET ORAL 3 TIMES DAILY
COMMUNITY
End: 2018-05-30 | Stop reason: ALTCHOICE

## 2017-05-15 RX ORDER — BUPROPION HYDROCHLORIDE 300 MG/1
300 TABLET ORAL
COMMUNITY
End: 2018-05-30 | Stop reason: ALTCHOICE

## 2017-05-15 NOTE — PROGRESS NOTES
HISTORY OF PRESENT ILLNESS  Jenny Gamboa is a 55 y.o. female. HPI   NEW patient consult referred by Dr. Keith Mccartney for breast pain and high risk family history. Patient has had BILATERAL breast pain for years. When she moves around her breast really hurt. Her breasts are always lumpy. She has had a recent 90 pound weight loss and her breasts have changed shape. Denies nipple discharge/retraction. Family History:  1 maternal uncle  of breast cancer. 1 maternal aunt  of breast cancer. 6 other maternal aunts were survivor of breast cancer. She has maternal cousins who are BRCA positive. Unknown if they are BRCA 1 or BRCA 2 positive. Her paternal grandmother  from ovarian cancer. Ages of diagnosis unknown. Last mammogram done at [address] [place] [address] in . Review of Systems   Constitutional: Positive for diaphoresis, malaise/fatigue and weight loss. HENT: Negative. Eyes: Negative. Respiratory: Positive for shortness of breath. Cardiovascular: Positive for chest pain, palpitations and leg swelling. Gastrointestinal: Negative. Genitourinary: Positive for frequency. Musculoskeletal: Positive for joint pain and myalgias. Skin: Positive for itching. Neurological: Negative. Endo/Heme/Allergies: Negative. Psychiatric/Behavioral: The patient is nervous/anxious.         Physical Exam    ASSESSMENT and PLAN  {ASSESSMENT/PLAN:14903}

## 2017-05-15 NOTE — PATIENT INSTRUCTIONS
Breast Pain: Care Instructions  Your Care Instructions  Breast tenderness and pain may come and go with your monthly periods (cyclic), or it may not follow any pattern (noncyclic). Breast pain is rarely caused by a serious health problem. You may need tests to find the cause. Follow-up care is a key part of your treatment and safety. Be sure to make and go to all appointments, and call your doctor if you are having problems. Its also a good idea to know your test results and keep a list of the medicines you take. How can you care for yourself at home? · If your doctor gave you medicine, take it exactly as prescribed. Call your doctor if you think you are having a problem with your medicine. · Take an over-the-counter pain medicine, such as acetaminophen (Tylenol), ibuprofen (Advil, Motrin), or naproxen (Aleve), to relieve pain and swelling. Read and follow all instructions on the label. · Do not take two or more pain medicines at the same time unless the doctor told you to. Many pain medicines have acetaminophen, which is Tylenol. Too much acetaminophen (Tylenol) can be harmful. · Wear a supportive bra, such as a sports bra or a jog bra. · Cut down on the amount of fat in your diet. If you need help planning healthy meals, see a dietitian. · Get at least 30 minutes of exercise on most days of the week. Walking is a good choice. You also may want to do other activities, such as running, swimming, cycling, or playing tennis or team sports. · Keep a healthy sleep pattern. Go to bed at the same time every night, and get up at the same time every day. When should you call for help? Call your doctor now or seek immediate medical care if:  · You have new changes in a breast, such as:  ¨ A lump or thickening in your breast or armpit. ¨ A change in the breast's size or shape. ¨ Skin changes, such as dimples or puckers. ¨ Nipple discharge.   ¨ A change in the color or feel of the skin of your breast or the darker area around the nipple (areola). ¨ A change in the shape of the nipple (it may look like it's being pulled into the breast). · You have symptoms of a breast infection, such as:  ¨ Increased pain, swelling, redness, or warmth around a breast.  ¨ Red streaks extending from the breast.  ¨ Pus draining from a breast.  ¨ A fever. Watch closely for changes in your health, and be sure to contact your doctor if:  · Your breast pain does not get better after 1 week. · You have a lump or thickening in your breast or armpit. Where can you learn more? Go to http://javi-lubna.info/. Enter J573 in the search box to learn more about \"Breast Pain: Care Instructions. \"  Current as of: May 27, 2016  Content Version: 11.2  © 6320-9295 [employer]IT. Care instructions adapted under license by [employer] (which disclaims liability or warranty for this information). If you have questions about a medical condition or this instruction, always ask your healthcare professional. [address] any warranty or liability for your use of this information. Breast Pain: Care Instructions  Your Care Instructions  Breast tenderness and pain may come and go with your monthly periods (cyclic), or it may not follow any pattern (noncyclic). Breast pain is rarely caused by a serious health problem. You may need tests to find the cause. Follow-up care is a key part of your treatment and safety. Be sure to make and go to all appointments, and call your doctor if you are having problems. Its also a good idea to know your test results and keep a list of the medicines you take. How can you care for yourself at home? · If your doctor gave you medicine, take it exactly as prescribed. Call your doctor if you think you are having a problem with your medicine.   · Take an over-the-counter pain medicine, such as acetaminophen (Tylenol), ibuprofen (Advil, Motrin), or naproxen (Aleve), to relieve pain and swelling. Read and follow all instructions on the label. · Do not take two or more pain medicines at the same time unless the doctor told you to. Many pain medicines have acetaminophen, which is Tylenol. Too much acetaminophen (Tylenol) can be harmful. · Wear a supportive bra, such as a sports bra or a jog bra. · Cut down on the amount of fat in your diet. If you need help planning healthy meals, see a dietitian. · Get at least 30 minutes of exercise on most days of the week. Walking is a good choice. You also may want to do other activities, such as running, swimming, cycling, or playing tennis or team sports. · Keep a healthy sleep pattern. Go to bed at the same time every night, and get up at the same time every day. When should you call for help? Call your doctor now or seek immediate medical care if:  · You have new changes in a breast, such as:  ¨ A lump or thickening in your breast or armpit. ¨ A change in the breast's size or shape. ¨ Skin changes, such as dimples or puckers. ¨ Nipple discharge. ¨ A change in the color or feel of the skin of your breast or the darker area around the nipple (areola). ¨ A change in the shape of the nipple (it may look like it's being pulled into the breast). · You have symptoms of a breast infection, such as:  ¨ Increased pain, swelling, redness, or warmth around a breast.  ¨ Red streaks extending from the breast.  ¨ Pus draining from a breast.  ¨ A fever. Watch closely for changes in your health, and be sure to contact your doctor if:  · Your breast pain does not get better after 1 week. · You have a lump or thickening in your breast or armpit. Where can you learn more? Go to http://javi-lubna.info/. Enter E008 in the search box to learn more about \"Breast Pain: Care Instructions. \"  Current as of: May 27, 2016  Content Version: 11.2  © 5338-2894 [employer], [employer].  Care instructions adapted under license by [address] (which disclaims liability or warranty for this information). If you have questions about a medical condition or this instruction, always ask your healthcare professional. [address] any warranty or liability for your use of this information.

## 2017-05-15 NOTE — PROGRESS NOTES
HISTORY OF PRESENT ILLNESS  Talyor Murillo is a 55 y.o. female. HPI  NEW patient consult referred by Dr. Stacey Bro for breast pain and high risk family history. Patient has had BILATERAL breast pain for years. When she moves around her breast really hurt. Her breasts are always lumpy. She has had a recent 90 pound weight loss and her breasts have changed shape. Denies nipple discharge/retraction.      Family History: 1 maternal uncle  of breast cancer. 1 maternal aunt  of breast cancer. 6 other maternal aunts were survivor of breast cancer. She has maternal cousins who are BRCA positive. Unknown if they are BRCA 1 or BRCA 2 positive. Her paternal grandmother  from ovarian cancer. Ages of diagnosis unknown.      Last mammogram done at [address] [place] [address] in . Past Medical History:   Diagnosis Date    Bipolar 1 disorder ([facility] [address]     GERD (gastroesophageal reflux disease)     Hypertension     Neuropathy     Ovarian cyst     PE (pulmonary embolism)     Presbyesophagus     Reflux     Spinal artery aneurysm (HCC)     Takayasu's arteritis (HCC)        Past Surgical History:   Procedure Laterality Date    HX APPENDECTOMY      HX GYN      C-sections    HX GYN      ablation       Social History     Social History    Marital status:      Spouse name: N/A    Number of children: N/A    Years of education: N/A     Occupational History    Not on file. Social History Main Topics    Smoking status: Never Smoker    Smokeless tobacco: Never Used    Alcohol use No    Drug use: No    Sexual activity: Yes     Partners: Male     Other Topics Concern    Not on file     Social History Narrative       Current Outpatient Prescriptions on File Prior to Visit   Medication Sig Dispense Refill    metroNIDAZOLE (FLAGYL) 500 mg tablet Take 1 Tab by mouth two (2) times a day for 7 days. 14 Tab 0    linaclotide (LINZESS) 145 mcg cap capsule Take 1 Cap by mouth Daily (before breakfast).  30 Cap 5    warfarin (COUMADIN) 10 mg tablet Take 1 Tab by mouth daily. 30 Tab 5    warfarin (COUMADIN) 5 mg tablet Take 1 Tab by mouth daily. 30 Tab 5    omeprazole (PRILOSEC) 40 mg capsule Take 1 Cap by mouth daily. 30 Cap 5    ondansetron (ZOFRAN ODT) 4 mg disintegrating tablet Take 1 Tab by mouth every eight (8) hours as needed for Nausea. 20 Tab 2    methadone (DOLOPHINE) 10 mg tablet Take 50 mg by mouth daily. 0    eszopiclone (LUNESTA) 3 mg tablet Take 1 Tab by mouth nightly. Max Daily Amount: 3 mg. 30 Tab 1    albuterol (PROVENTIL HFA, VENTOLIN HFA, PROAIR HFA) 90 mcg/actuation inhaler Take 2 Puffs by inhalation every four (4) hours as needed for Wheezing or Shortness of Breath. 1 Inhaler 2    budesonide-formoterol (SYMBICORT) 160-4.5 mcg/actuation HFA inhaler Take 2 Puffs by inhalation two (2) times a day. 1 Inhaler 3    albuterol sulfate (PROVENTIL;VENTOLIN) 2.5 mg/0.5 mL nebu nebulizer solution 0.5 mL by Nebulization route every four (4) hours as needed for Wheezing. 1 Package 2    predniSONE (STERAPRED DS) 10 mg dose pack Use as directed on packaging x 6 days 1 Package 0    ALPRAZolam (XANAX) 0.5 mg tablet TAKE ONE TABLET BY MOUTH DAILY AS NEEDED FOR ANXIETY. Must last 30 days 30 Tab 2     No current facility-administered medications on file prior to visit. Allergies   Allergen Reactions    Tetanus Immune Globulin Swelling       OB History      Para Term  AB TAB SAB Ectopic Multiple Living    4    1     3        Obstetric Comments    Menarche:  15. LMP: 36.  # of Children:  3. Age at Delivery of First Child:  25.   Hysterectomy/oophorectomy:  NO/NO. Breast Bx:  no.  Hx of Breast Feeding:  no. BCP:  no. Hormone therapy:  no.           ROS  Constitutional: Positive for diaphoresis, malaise/fatigue and weight loss. HENT: Negative. Eyes: Negative. Respiratory: Positive for shortness of breath. Cardiovascular: Positive for chest pain, palpitations and leg swelling. Gastrointestinal: Negative. Genitourinary: Positive for frequency. Musculoskeletal: Positive for joint pain and myalgias. Skin: Positive for itching. Neurological: Negative. Endo/Heme/Allergies: Negative. Psychiatric/Behavioral: The patient is nervous/anxious. Physical Exam   Cardiovascular: Normal rate and normal heart sounds. Pulmonary/Chest: Breath sounds normal. Right breast exhibits no inverted nipple, no mass, no nipple discharge, no skin change and no tenderness. Left breast exhibits tenderness. Left breast exhibits no inverted nipple, no mass, no nipple discharge and no skin change. Breasts are symmetrical.   Lymphadenopathy:        Right cervical: No superficial cervical, no deep cervical and no posterior cervical adenopathy present. Left cervical: No superficial cervical, no deep cervical and no posterior cervical adenopathy present. Right axillary: No pectoral and no lateral adenopathy present. Left axillary: No pectoral and no lateral adenopathy present. ASSESSMENT and PLAN    ICD-10-CM ICD-9-CM    1. Family history of breast cancer Z80.3 V16.3      Pt here today to consult on significant family hx of BC. Using the [address], calculated pt's lifetime risk is . 20% for breast cancer. This qualifies her for enrollment into our high risk clinic that includes annual breast imaging and follow-up appointments with our nurse practitioner. Also addressed LT breast pain, which is likely related to fibrocystic breast changes. Will treat with Vitamin E and Primrose oil for breast pain (followed by Elemental Iodine if pain has not resolved in 1 month). Will order genetic testing and MRI. F/u with Estela SPANN) in 1 year. This plan was reviewed with the patient and patient agrees. All questions were answered.     Written by Debby Brunson, as dictated by Dr. Sourav Rust MD.

## 2017-05-15 NOTE — MR AVS SNAPSHOT
Visit Information Date & Time Provider Department Dept. Phone Encounter #  
 5/15/2017  8:38 AM Rafael Block MD [facility] [phone] [id number] Follow-up Instructions Return in about 1 year (around 5/15/2018) for with Pam Savage NP, with mammogram.  
 Follow-up and Disposition History Upcoming Health Maintenance Date Due DTaP/Tdap/Td series (1 - Tdap) 6/9/1991 INFLUENZA AGE 9 TO ADULT 8/1/2017 PAP AKA CERVICAL CYTOLOGY 5/3/2020 Allergies as of 5/15/2017  Review Complete On: 2/54/0762 By: Rafael Block MD  
  
 Severity Noted Reaction Type Reactions Tetanus Immune Globulin  10/08/2013    Swelling Current Immunizations  Never Reviewed No immunizations on file. Not reviewed this visit You Were Diagnosed With   
  
 Codes Comments Family history of breast cancer    -  Primary ICD-10-CM: Z80.3 ICD-9-CM: V16.3 Vitals BP Pulse Height(growth percentile) Weight(growth percentile) LMP BMI  
 127/64 71 5' 4\" (1.626 m) 140 lb (63.5 kg) 07/01/2010 24.03 kg/m2 OB Status Smoking Status Ablation Never Smoker BMI and BSA Data Body Mass Index Body Surface Area 24.03 kg/m 2 1.69 m 2 Preferred Pharmacy Pharmacy Name Phone [facility] [address], [address] [place] [address] Marya Santamaria [phone] Your Updated Medication List  
  
   
This list is accurate as of: 5/15/17  3:59 PM.  Always use your most recent med list.  
  
  
  
  
 * albuterol sulfate 2.5 mg/0.5 mL Nebu nebulizer solution Commonly known as:  PROVENTIL;VENTOLIN  
0.5 mL by Nebulization route every four (4) hours as needed for Wheezing. * albuterol 90 mcg/actuation inhaler Commonly known as:  PROVENTIL HFA, VENTOLIN HFA, PROAIR HFA Take 2 Puffs by inhalation every four (4) hours as needed for Wheezing or Shortness of Breath. ALPRAZolam 0.5 mg tablet Commonly known as: XANAX  
TAKE ONE TABLET BY MOUTH DAILY AS NEEDED FOR ANXIETY. Must last 30 days  
  
 budesonide-formoterol 160-4.5 mcg/actuation HFA inhaler Commonly known as:  SYMBICORT Take 2 Puffs by inhalation two (2) times a day. buPROPion  mg XL tablet Commonly known as:  Xiang Mica Take 300 mg by mouth every morning.  
  
 eszopiclone 3 mg tablet Commonly known as:  Lonn Colon Take 1 Tab by mouth nightly. Max Daily Amount: 3 mg.  
  
 linaclotide 145 mcg Cap capsule Commonly known as:  Frederic Rita Take 1 Cap by mouth Daily (before breakfast). methadone 10 mg tablet Commonly known as:  DOLOPHINE Take 50 mg by mouth daily. metroNIDAZOLE 500 mg tablet Commonly known as:  FLAGYL Take 1 Tab by mouth two (2) times a day for 7 days. omeprazole 40 mg capsule Commonly known as:  PRILOSEC Take 1 Cap by mouth daily. ondansetron 4 mg disintegrating tablet Commonly known as:  ZOFRAN ODT Take 1 Tab by mouth every eight (8) hours as needed for Nausea. predniSONE 10 mg dose pack Commonly known as:  STERAPRED DS Use as directed on packaging x 6 days TEGretol 200 mg tablet Generic drug:  carBAMazepine Take 350 mg by mouth three (3) times daily. * warfarin 10 mg tablet Commonly known as:  COUMADIN Take 1 Tab by mouth daily. * warfarin 5 mg tablet Commonly known as:  COUMADIN Take 1 Tab by mouth daily. * Notice: This list has 4 medication(s) that are the same as other medications prescribed for you. Read the directions carefully, and ask your doctor or other care provider to review them with you. We Performed the Following [facility]-ANALYSIS [[id number] Custom] Follow-up Instructions Return in about 1 year (around 5/15/2018) for with Mya Arora NP, with mammogram.  
  
To-Do List   
 05/15/2017 Imaging:  MRI BREAST BI W WO CONT Patient Instructions Breast Pain: Care Instructions Your Care Instructions Breast tenderness and pain may come and go with your monthly periods (cyclic), or it may not follow any pattern (noncyclic). Breast pain is rarely caused by a serious health problem. You may need tests to find the cause. Follow-up care is a key part of your treatment and safety. Be sure to make and go to all appointments, and call your doctor if you are having problems. Its also a good idea to know your test results and keep a list of the medicines you take. How can you care for yourself at home? · If your doctor gave you medicine, take it exactly as prescribed. Call your doctor if you think you are having a problem with your medicine. · Take an over-the-counter pain medicine, such as acetaminophen (Tylenol), ibuprofen (Advil, Motrin), or naproxen (Aleve), to relieve pain and swelling. Read and follow all instructions on the label. · Do not take two or more pain medicines at the same time unless the doctor told you to. Many pain medicines have acetaminophen, which is Tylenol. Too much acetaminophen (Tylenol) can be harmful. · Wear a supportive bra, such as a sports bra or a jog bra. · Cut down on the amount of fat in your diet. If you need help planning healthy meals, see a dietitian. · Get at least 30 minutes of exercise on most days of the week. Walking is a good choice. You also may want to do other activities, such as running, swimming, cycling, or playing tennis or team sports. · Keep a healthy sleep pattern. Go to bed at the same time every night, and get up at the same time every day. When should you call for help? Call your doctor now or seek immediate medical care if: 
· You have new changes in a breast, such as: ¨ A lump or thickening in your breast or armpit. ¨ A change in the breast's size or shape. ¨ Skin changes, such as dimples or puckers. ¨ Nipple discharge. ¨ A change in the color or feel of the skin of your breast or the darker area around the nipple (areola).  
¨ A change in the shape of the nipple (it may look like it's being pulled into the breast). · You have symptoms of a breast infection, such as: 
¨ Increased pain, swelling, redness, or warmth around a breast. 
¨ Red streaks extending from the breast. 
¨ Pus draining from a breast. 
¨ A fever. Watch closely for changes in your health, and be sure to contact your doctor if: 
· Your breast pain does not get better after 1 week. · You have a lump or thickening in your breast or armpit. Where can you learn more? Go to http://javiRed Hills Acquisitionslubna.info/. Enter Z027 in the search box to learn more about \"Breast Pain: Care Instructions. \" Current as of: May 27, 2016 Content Version: 11.2 © 8021-4647 [employer]. Care instructions adapted under license by [employer] (which disclaims liability or warranty for this information). If you have questions about a medical condition or this instruction, always ask your healthcare professional. [place] [address] any warranty or liability for your use of this information. Breast Pain: Care Instructions Your Care Instructions Breast tenderness and pain may come and go with your monthly periods (cyclic), or it may not follow any pattern (noncyclic). Breast pain is rarely caused by a serious health problem. You may need tests to find the cause. Follow-up care is a key part of your treatment and safety. Be sure to make and go to all appointments, and call your doctor if you are having problems. Its also a good idea to know your test results and keep a list of the medicines you take. How can you care for yourself at home? · If your doctor gave you medicine, take it exactly as prescribed. Call your doctor if you think you are having a problem with your medicine. · Take an over-the-counter pain medicine, such as acetaminophen (Tylenol), ibuprofen (Advil, Motrin), or naproxen (Aleve), to relieve pain and swelling.  Read and follow all instructions on the label. · Do not take two or more pain medicines at the same time unless the doctor told you to. Many pain medicines have acetaminophen, which is Tylenol. Too much acetaminophen (Tylenol) can be harmful. · Wear a supportive bra, such as a sports bra or a jog bra. · Cut down on the amount of fat in your diet. If you need help planning healthy meals, see a dietitian. · Get at least 30 minutes of exercise on most days of the week. Walking is a good choice. You also may want to do other activities, such as running, swimming, cycling, or playing tennis or team sports. · Keep a healthy sleep pattern. Go to bed at the same time every night, and get up at the same time every day. When should you call for help? Call your doctor now or seek immediate medical care if: 
· You have new changes in a breast, such as: ¨ A lump or thickening in your breast or armpit. ¨ A change in the breast's size or shape. ¨ Skin changes, such as dimples or puckers. ¨ Nipple discharge. ¨ A change in the color or feel of the skin of your breast or the darker area around the nipple (areola). ¨ A change in the shape of the nipple (it may look like it's being pulled into the breast). · You have symptoms of a breast infection, such as: 
¨ Increased pain, swelling, redness, or warmth around a breast. 
¨ Red streaks extending from the breast. 
¨ Pus draining from a breast. 
¨ A fever. Watch closely for changes in your health, and be sure to contact your doctor if: 
· Your breast pain does not get better after 1 week. · You have a lump or thickening in your breast or armpit. Where can you learn more? Go to http://javi-lubna.info/. Enter I522 in the search box to learn more about \"Breast Pain: Care Instructions. \" Current as of: May 27, 2016 Content Version: 11.2 © 0900-9064 [employer].  Care instructions adapted under license by [employer] (which disclaims liability or warranty for this information). If you have questions about a medical condition or this instruction, always ask your healthcare professional. [address] any warranty or liability for your use of this information. Introducing [facility]! Dear Wing Gray: Thank you for requesting a UserVoice account. Our records indicate that you already have an active UserVoice account. You can access your account anytime at [email or web address] Did you know that you can access your hospital and ER discharge instructions at any time in UserVoice? You can also review all of your test results from your hospital stay or ER visit. Additional Information If you have questions, please visit the Frequently Asked Questions section of the UserVoice website at [email or web address] Remember, UserVoice is NOT to be used for urgent needs. For medical emergencies, dial 911. Now available from your iPhone and Android! Please provide this summary of care documentation to your next provider. Your primary care clinician is listed as Charmaine Rogel. If you have any questions after today's visit, please call [phone].

## 2017-05-15 NOTE — LETTER
5/15/2017 1:47 PM 
 
Patient:  Marj Velazquez YOB: 1970 Date of Visit: 5/15/2017 Dear Keegan Phi: Thank you for referring Ms. Laura Ramirez to me for evaluation/treatment. Below are the relevant portions of my assessment and plan of care. HISTORY OF PRESENT ILLNESS Marj Velazquez is a 55 y.o. female. HPI 
NEW patient consult referred by Dr. Joya Lieberman for breast pain and high risk family history. Patient has had BILATERAL breast pain for years. When she moves around her breast really hurt. Her breasts are always lumpy. She has had a recent 90 pound weight loss and her breasts have changed shape. Denies nipple discharge/retraction.  
  
Family History: 1 maternal uncle  of breast cancer. 1 maternal aunt  of breast cancer. 6 other maternal aunts were survivor of breast cancer. She has maternal cousins who are BRCA positive. Unknown if they are BRCA 1 or BRCA 2 positive. Her paternal grandmother  from ovarian cancer. Ages of diagnosis unknown.  
  
Last mammogram done at [address] [place] [address] in . Past Medical History:  
Diagnosis Date  Bipolar 1 disorder ([facility] [address]  GERD (gastroesophageal reflux disease)  Hypertension  Neuropathy  Ovarian cyst   
 PE (pulmonary embolism)  Presbyesophagus  Reflux  Spinal artery aneurysm ([facility] [address]  Takayasu's arteritis ([facility] [address] Past Surgical History:  
Procedure Laterality Date  HX APPENDECTOMY  HX GYN    
 C-sections  HX GYN    
 ablation Social History Social History  Marital status:  Spouse name: N/A  
 Number of children: N/A  
 Years of education: N/A Occupational History  Not on file. Social History Main Topics  Smoking status: Never Smoker  Smokeless tobacco: Never Used  Alcohol use No  
 Drug use: No  
 Sexual activity: Yes  
  Partners: Male Other Topics Concern  Not on file Social History Narrative Current Outpatient Prescriptions on File Prior to Visit Medication Sig Dispense Refill  metroNIDAZOLE (FLAGYL) 500 mg tablet Take 1 Tab by mouth two (2) times a day for 7 days. 14 Tab 0  
 linaclotide (LINZESS) 145 mcg cap capsule Take 1 Cap by mouth Daily (before breakfast). 30 Cap 5  warfarin (COUMADIN) 10 mg tablet Take 1 Tab by mouth daily. 30 Tab 5  warfarin (COUMADIN) 5 mg tablet Take 1 Tab by mouth daily. 30 Tab 5  
 omeprazole (PRILOSEC) 40 mg capsule Take 1 Cap by mouth daily. 30 Cap 5  
 ondansetron (ZOFRAN ODT) 4 mg disintegrating tablet Take 1 Tab by mouth every eight (8) hours as needed for Nausea. 20 Tab 2  
 methadone (DOLOPHINE) 10 mg tablet Take 50 mg by mouth daily. 0  
 eszopiclone (LUNESTA) 3 mg tablet Take 1 Tab by mouth nightly. Max Daily Amount: 3 mg. 30 Tab 1  
 albuterol (PROVENTIL HFA, VENTOLIN HFA, PROAIR HFA) 90 mcg/actuation inhaler Take 2 Puffs by inhalation every four (4) hours as needed for Wheezing or Shortness of Breath. 1 Inhaler 2  
 budesonide-formoterol (SYMBICORT) 160-4.5 mcg/actuation HFA inhaler Take 2 Puffs by inhalation two (2) times a day. 1 Inhaler 3  
 albuterol sulfate (PROVENTIL;VENTOLIN) 2.5 mg/0.5 mL nebu nebulizer solution 0.5 mL by Nebulization route every four (4) hours as needed for Wheezing. 1 Package 2  predniSONE (STERAPRED DS) 10 mg dose pack Use as directed on packaging x 6 days 1 Package 0  
 ALPRAZolam (XANAX) 0.5 mg tablet TAKE ONE TABLET BY MOUTH DAILY AS NEEDED FOR ANXIETY. Must last 30 days 30 Tab 2 No current facility-administered medications on file prior to visit. Allergies Allergen Reactions  Tetanus Immune Globulin Swelling OB History  Para Term  AB TAB SAB Ectopic Multiple Living 4    1     3 Obstetric Comments Menarche:  15. LMP: 36.  # of Children:  3. Age at Delivery of First Child:  25.   Hysterectomy/oophorectomy:  NO/NO. Breast Bx:  no.  Hx of Breast Feeding:  no. BCP:  no. Hormone therapy:  no.   
  
 
 
ROS Constitutional: Positive for diaphoresis, malaise/fatigue and weight loss. HENT: Negative. Eyes: Negative. Respiratory: Positive for shortness of breath. Cardiovascular: Positive for chest pain, palpitations and leg swelling. Gastrointestinal: Negative. Genitourinary: Positive for frequency. Musculoskeletal: Positive for joint pain and myalgias. Skin: Positive for itching. Neurological: Negative. Endo/Heme/Allergies: Negative. Psychiatric/Behavioral: The patient is nervous/anxious. Physical Exam  
Cardiovascular: Normal rate and normal heart sounds. Pulmonary/Chest: Breath sounds normal. Right breast exhibits no inverted nipple, no mass, no nipple discharge, no skin change and no tenderness. Left breast exhibits tenderness. Left breast exhibits no inverted nipple, no mass, no nipple discharge and no skin change. Breasts are symmetrical.  
Lymphadenopathy:  
     Right cervical: No superficial cervical, no deep cervical and no posterior cervical adenopathy present. Left cervical: No superficial cervical, no deep cervical and no posterior cervical adenopathy present. Right axillary: No pectoral and no lateral adenopathy present. Left axillary: No pectoral and no lateral adenopathy present. ASSESSMENT and PLAN 
  ICD-10-CM ICD-9-CM 1. Family history of breast cancer Z80.3 V16.3 Pt here today to consult on significant family hx of BC. Using the [facility] [address], calculated pt's lifetime risk is . 20% for breast cancer. This qualifies her for enrollment into our high risk clinic that includes annual breast imaging and follow-up appointments with our nurse practitioner. Also addressed LT breast pain, which is likely related to fibrocystic breast changes. Will treat with Vitamin E and Primrose oil for breast pain (followed by Elemental Iodine if pain has not resolved in 1 month). Will order genetic testing and MRI. F/u with Brit SPANN) in 1 year. This plan was reviewed with the patient and patient agrees. All questions were answered. Written by Fam Robledo, as dictated by Dr. Keyla Naqvi MD.  
 
 
If you have questions, please do not hesitate to call me. I look forward to following Ms. Miles Boggsmaevecarol along with you. Sincerely, 
 
Zohaib Duggan MD

## 2017-05-15 NOTE — COMMUNICATION BODY
HISTORY OF PRESENT ILLNESS  Yoav Peck is a 55 y.o. female. HPI  NEW patient consult referred by Dr. Phyllis Ramos for breast pain and high risk family history. Patient has had BILATERAL breast pain for years. When she moves around her breast really hurt. Her breasts are always lumpy. She has had a recent 90 pound weight loss and her breasts have changed shape. Denies nipple discharge/retraction.      Family History: 1 maternal uncle  of breast cancer. 1 maternal aunt  of breast cancer. 6 other maternal aunts were survivor of breast cancer. She has maternal cousins who are BRCA positive. Unknown if they are BRCA 1 or BRCA 2 positive. Her paternal grandmother  from ovarian cancer. Ages of diagnosis unknown.      Last mammogram done at [address] [place] [address] in . Past Medical History:   Diagnosis Date    Bipolar 1 disorder ([facility]     GERD (gastroesophageal reflux disease)     Hypertension     Neuropathy     Ovarian cyst     PE (pulmonary embolism)     Presbyesophagus     Reflux     Spinal artery aneurysm (HCC)     Takayasu's arteritis (HCC)        Past Surgical History:   Procedure Laterality Date    HX APPENDECTOMY      HX GYN      C-sections    HX GYN      ablation       Social History     Social History    Marital status:      Spouse name: N/A    Number of children: N/A    Years of education: N/A     Occupational History    Not on file. Social History Main Topics    Smoking status: Never Smoker    Smokeless tobacco: Never Used    Alcohol use No    Drug use: No    Sexual activity: Yes     Partners: Male     Other Topics Concern    Not on file     Social History Narrative       Current Outpatient Prescriptions on File Prior to Visit   Medication Sig Dispense Refill    metroNIDAZOLE (FLAGYL) 500 mg tablet Take 1 Tab by mouth two (2) times a day for 7 days. 14 Tab 0    linaclotide (LINZESS) 145 mcg cap capsule Take 1 Cap by mouth Daily (before breakfast).  30 Cap 5    warfarin (COUMADIN) 10 mg tablet Take 1 Tab by mouth daily. 30 Tab 5    warfarin (COUMADIN) 5 mg tablet Take 1 Tab by mouth daily. 30 Tab 5    omeprazole (PRILOSEC) 40 mg capsule Take 1 Cap by mouth daily. 30 Cap 5    ondansetron (ZOFRAN ODT) 4 mg disintegrating tablet Take 1 Tab by mouth every eight (8) hours as needed for Nausea. 20 Tab 2    methadone (DOLOPHINE) 10 mg tablet Take 50 mg by mouth daily. 0    eszopiclone (LUNESTA) 3 mg tablet Take 1 Tab by mouth nightly. Max Daily Amount: 3 mg. 30 Tab 1    albuterol (PROVENTIL HFA, VENTOLIN HFA, PROAIR HFA) 90 mcg/actuation inhaler Take 2 Puffs by inhalation every four (4) hours as needed for Wheezing or Shortness of Breath. 1 Inhaler 2    budesonide-formoterol (SYMBICORT) 160-4.5 mcg/actuation HFA inhaler Take 2 Puffs by inhalation two (2) times a day. 1 Inhaler 3    albuterol sulfate (PROVENTIL;VENTOLIN) 2.5 mg/0.5 mL nebu nebulizer solution 0.5 mL by Nebulization route every four (4) hours as needed for Wheezing. 1 Package 2    predniSONE (STERAPRED DS) 10 mg dose pack Use as directed on packaging x 6 days 1 Package 0    ALPRAZolam (XANAX) 0.5 mg tablet TAKE ONE TABLET BY MOUTH DAILY AS NEEDED FOR ANXIETY. Must last 30 days 30 Tab 2     No current facility-administered medications on file prior to visit. Allergies   Allergen Reactions    Tetanus Immune Globulin Swelling       OB History      Para Term  AB TAB SAB Ectopic Multiple Living    4    1     3        Obstetric Comments    Menarche:  15. LMP: 36.  # of Children:  3. Age at Delivery of First Child:  25.   Hysterectomy/oophorectomy:  NO/NO. Breast Bx:  no.  Hx of Breast Feeding:  no. BCP:  no. Hormone therapy:  no.           ROS  Constitutional: Positive for diaphoresis, malaise/fatigue and weight loss. HENT: Negative. Eyes: Negative. Respiratory: Positive for shortness of breath. Cardiovascular: Positive for chest pain, palpitations and leg swelling. Gastrointestinal: Negative. Genitourinary: Positive for frequency. Musculoskeletal: Positive for joint pain and myalgias. Skin: Positive for itching. Neurological: Negative. Endo/Heme/Allergies: Negative. Psychiatric/Behavioral: The patient is nervous/anxious. Physical Exam   Cardiovascular: Normal rate and normal heart sounds. Pulmonary/Chest: Breath sounds normal. Right breast exhibits no inverted nipple, no mass, no nipple discharge, no skin change and no tenderness. Left breast exhibits tenderness. Left breast exhibits no inverted nipple, no mass, no nipple discharge and no skin change. Breasts are symmetrical.   Lymphadenopathy:        Right cervical: No superficial cervical, no deep cervical and no posterior cervical adenopathy present. Left cervical: No superficial cervical, no deep cervical and no posterior cervical adenopathy present. Right axillary: No pectoral and no lateral adenopathy present. Left axillary: No pectoral and no lateral adenopathy present. ASSESSMENT and PLAN    ICD-10-CM ICD-9-CM    1. Family history of breast cancer Z80.3 V16.3      Pt here today to consult on significant family hx of BC. Using the [employer] [facility] [address], calculated pt's lifetime risk is . 20% for breast cancer. This qualifies her for enrollment into our high risk clinic that includes annual breast imaging and follow-up appointments with our nurse practitioner. Also addressed LT breast pain, which is likely related to fibrocystic breast changes. Will treat with Vitamin E and Primrose oil for breast pain (followed by Elemental Iodine if pain has not resolved in 1 month). Will order genetic testing and MRI. F/u with [facility]) in 1 year. This plan was reviewed with the patient and patient agrees. All questions were answered.     Written by Noni De, as dictated by Dr. Hanna Shi MD.

## 2017-05-23 RX ORDER — LISINOPRIL 20 MG/1
TABLET ORAL
Qty: 30 TAB | Refills: 1 | Status: SHIPPED | OUTPATIENT
Start: 2017-05-23 | End: 2018-05-30 | Stop reason: SDUPTHER

## 2017-06-13 ENCOUNTER — TELEPHONE (OUTPATIENT)
Dept: SURGERY | Age: 47
End: 2017-06-13

## 2017-06-13 NOTE — TELEPHONE ENCOUNTER
Called patient with results of genetic test which are negative. Patient is happy to hear this. Dr. Tony Scott had ordered the patient to have a breast MRI which she has not had done yet. She had one scheduled but then had car problems and still needs to reschedule it. I provided the patient with the direct line that she can call [facility] /scheduling herself to get this scheduled. I encouraged her to call us if she needs any assistance and she understands and is appreciative.

## 2017-06-20 ENCOUNTER — DOCUMENTATION ONLY (OUTPATIENT)
Dept: FAMILY MEDICINE CLINIC | Age: 47
End: 2017-06-20

## 2017-07-17 ENCOUNTER — TELEPHONE (OUTPATIENT)
Dept: FAMILY MEDICINE CLINIC | Age: 47
End: 2017-07-17

## 2017-07-17 NOTE — TELEPHONE ENCOUNTER
Pt calling and states that the pharmacy states to her that they have lost the rx that was sen to them back in 5/17 for the medication Linzess and that she needs for the nurse to call them to give the permission to get a new rx to her, the pharmacy is [phone]. Please call the pt at [phone].

## 2017-10-12 DIAGNOSIS — K21.9 GASTROESOPHAGEAL REFLUX DISEASE WITHOUT ESOPHAGITIS: ICD-10-CM

## 2017-10-12 RX ORDER — OMEPRAZOLE 40 MG/1
CAPSULE, DELAYED RELEASE ORAL
Qty: 30 CAP | Refills: 3 | Status: SHIPPED | OUTPATIENT
Start: 2017-10-12 | End: 2018-12-06 | Stop reason: SDUPTHER

## 2018-05-30 ENCOUNTER — TELEPHONE (OUTPATIENT)
Dept: FAMILY MEDICINE CLINIC | Age: 48
End: 2018-05-30

## 2018-05-30 ENCOUNTER — OFFICE VISIT (OUTPATIENT)
Dept: FAMILY MEDICINE CLINIC | Age: 48
End: 2018-05-30

## 2018-05-30 VITALS
DIASTOLIC BLOOD PRESSURE: 108 MMHG | WEIGHT: 134 LBS | RESPIRATION RATE: 16 BRPM | TEMPERATURE: 98.7 F | HEIGHT: 64 IN | SYSTOLIC BLOOD PRESSURE: 158 MMHG | OXYGEN SATURATION: 97 % | BODY MASS INDEX: 22.88 KG/M2 | HEART RATE: 70 BPM

## 2018-05-30 DIAGNOSIS — K21.9 GASTROESOPHAGEAL REFLUX DISEASE WITHOUT ESOPHAGITIS: ICD-10-CM

## 2018-05-30 DIAGNOSIS — M31.4 TAKAYASU'S DISEASE (HCC): Primary | ICD-10-CM

## 2018-05-30 DIAGNOSIS — J45.20 MILD INTERMITTENT ASTHMA WITHOUT COMPLICATION: ICD-10-CM

## 2018-05-30 DIAGNOSIS — I10 ESSENTIAL HYPERTENSION: ICD-10-CM

## 2018-05-30 DIAGNOSIS — F41.9 ANXIETY: ICD-10-CM

## 2018-05-30 RX ORDER — ONDANSETRON 4 MG/1
4 TABLET, ORALLY DISINTEGRATING ORAL
Qty: 20 TAB | Refills: 2 | Status: SHIPPED | OUTPATIENT
Start: 2018-05-30 | End: 2019-04-10

## 2018-05-30 RX ORDER — RANITIDINE 150 MG/1
150 TABLET, FILM COATED ORAL 2 TIMES DAILY
Qty: 60 TAB | Refills: 5 | Status: SHIPPED | OUTPATIENT
Start: 2018-05-30 | End: 2019-04-10

## 2018-05-30 RX ORDER — LISINOPRIL 20 MG/1
TABLET ORAL
Qty: 30 TAB | Refills: 5 | Status: SHIPPED | OUTPATIENT
Start: 2018-05-30 | End: 2019-03-21 | Stop reason: SDUPTHER

## 2018-05-30 RX ORDER — WARFARIN SODIUM 5 MG/1
5 TABLET ORAL DAILY
Qty: 30 TAB | Refills: 5 | Status: SHIPPED | OUTPATIENT
Start: 2018-05-30 | End: 2018-10-03

## 2018-05-30 RX ORDER — ALBUTEROL SULFATE 2.5 MG/.5ML
2.5 SOLUTION RESPIRATORY (INHALATION)
Qty: 15 ML | Refills: 1 | Status: SHIPPED | OUTPATIENT
Start: 2018-05-30 | End: 2021-05-25 | Stop reason: SDUPTHER

## 2018-05-30 RX ORDER — ALBUTEROL SULFATE 90 UG/1
2 AEROSOL, METERED RESPIRATORY (INHALATION)
Qty: 1 INHALER | Refills: 2 | Status: SHIPPED | OUTPATIENT
Start: 2018-05-30

## 2018-05-30 RX ORDER — ENOXAPARIN SODIUM 100 MG/ML
100 INJECTION SUBCUTANEOUS DAILY
Qty: 7 SYRINGE | Refills: 0 | Status: SHIPPED | OUTPATIENT
Start: 2018-05-30 | End: 2018-10-03

## 2018-05-30 NOTE — TELEPHONE ENCOUNTER
Pt was confused if she was to take both meds at the same time. I informed to take the lovenox once per day and then when that is finished to start warfarin once per day and come in on Friday for INR ck.

## 2018-05-30 NOTE — MR AVS SNAPSHOT
[address] [address] 
[phone] Patient: Dillan Del Rosario MRN: [id number] SKN:[birth year] Visit Information Date & Time Provider Department Dept. Phone Encounter #  
 5/30/2018  9:15 AM Ama Martell NP [facility] [facility] [phone] [id number] Follow-up Instructions Return for double book 95 18 07 Friday June 1 for INR. Upcoming Health Maintenance Date Due DTaP/Tdap/Td series (1 - Tdap) 6/9/1991 Influenza Age 5 to Adult 8/1/2018 PAP AKA CERVICAL CYTOLOGY 5/3/2020 Allergies as of 5/30/2018  Review Complete On: 5/30/2018 By: Juan José Hitchcock LPN Severity Noted Reaction Type Reactions Tetanus Immune Globulin  10/08/2013    Swelling Current Immunizations  Never Reviewed No immunizations on file. Not reviewed this visit You Were Diagnosed With   
  
 Codes Comments Takayasu's disease ([facility]    -  Primary ICD-10-CM: M31.4 ICD-9-CM: 446.7 Mild intermittent asthma without complication     DQN-39-IX: J45.20 ICD-9-CM: 493.90 Essential hypertension     ICD-10-CM: I10 
ICD-9-CM: 401.9 Anxiety     ICD-10-CM: F41.9 ICD-9-CM: 300.00 Gastroesophageal reflux disease without esophagitis     ICD-10-CM: K21.9 ICD-9-CM: 530.81 Vitals BP Pulse Temp Resp Height(growth percentile) Weight(growth percentile) (!) 158/108 70 98.7 °F (37.1 °C) (Oral) 16 5' 4\" (1.626 m) 134 lb (60.8 kg) SpO2 BMI OB Status Smoking Status 97% 23 kg/m2 Ablation Never Smoker Vitals History BMI and BSA Data Body Mass Index Body Surface Area  
 23 kg/m 2 1.66 m 2 Preferred Pharmacy Pharmacy Name Phone [facility] [address] Celedonio Frankel [phone] Your Updated Medication List  
  
   
This list is accurate as of 5/30/18  9:57 AM.  Always use your most recent med list.  
  
  
  
  
 * albuterol 90 mcg/actuation inhaler Commonly known as:  PROVENTIL HFA, VENTOLIN HFA, PROAIR HFA Take 2 Puffs by inhalation every four (4) hours as needed for Wheezing or Shortness of Breath. * albuterol sulfate 2.5 mg/0.5 mL Nebu nebulizer solution Commonly known as:  PROVENTIL;VENTOLIN  
0.5 mL by Nebulization route every four (4) hours as needed for Wheezing. budesonide-formoterol 160-4.5 mcg/actuation Hfaa Commonly known as:  SYMBICORT Take 2 Puffs by inhalation two (2) times a day. enoxaparin 100 mg/mL Commonly known as:  LOVENOX  
100 mg by SubCUTAneous route daily. eszopiclone 3 mg tablet Commonly known as:  Reema Gale Take 1 Tab by mouth nightly. Max Daily Amount: 3 mg.  
  
 lisinopril 20 mg tablet Commonly known as:  PRINIVIL, ZESTRIL  
TAKE ONE TABLET BY MOUTH EVERY DAY  
  
 methadone 10 mg tablet Commonly known as:  DOLOPHINE Take 50 mg by mouth daily. omeprazole 40 mg capsule Commonly known as:  PRILOSEC  
take 1 capsule by mouth once daily  
  
 ondansetron 4 mg disintegrating tablet Commonly known as:  ZOFRAN ODT Take 1 Tab by mouth every eight (8) hours as needed for Nausea. raNITIdine 150 mg tablet Commonly known as:  ZANTAC Take 1 Tab by mouth two (2) times a day. warfarin 5 mg tablet Commonly known as:  COUMADIN Take 1 Tab by mouth daily. * Notice: This list has 2 medication(s) that are the same as other medications prescribed for you. Read the directions carefully, and ask your doctor or other care provider to review them with you. Prescriptions Sent to Pharmacy Refills  
 enoxaparin (LOVENOX) 100 mg/mL 0 Si mg by SubCUTAneous route daily. Class: Normal  
 Pharmacy: [address], [address] [place] [address] #: [phone] Route: SubCUTAneous  
 warfarin (COUMADIN) 5 mg tablet 5 Sig: Take 1 Tab by mouth daily.   
 Class: Normal  
 Pharmacy: [facility] [address] [place] [address] #: [phone] Route: Oral  
 lisinopril (PRINIVIL, ZESTRIL) 20 mg tablet 5 Sig: TAKE ONE TABLET BY MOUTH EVERY DAY Class: Normal  
 Pharmacy: [id number] Ph #: [phone]  
 albuterol (PROVENTIL HFA, VENTOLIN HFA, PROAIR HFA) 90 mcg/actuation inhaler 2 Sig: Take 2 Puffs by inhalation every four (4) hours as needed for Wheezing or Shortness of Breath. Class: Normal  
 Pharmacy: [address] Ph #: [phone] Route: Inhalation  
 albuterol sulfate (PROVENTIL;VENTOLIN) 2.5 mg/0.5 mL nebu nebulizer solution 1 Si.5 mL by Nebulization route every four (4) hours as needed for Wheezing. Class: Normal  
 Pharmacy: [address] #: [phone] Route: Nebulization  
 raNITIdine (ZANTAC) 150 mg tablet 5 Sig: Take 1 Tab by mouth two (2) times a day. Class: Normal  
 Pharmacy: [address] #: [phone] Route: Oral  
 ondansetron (ZOFRAN ODT) 4 mg disintegrating tablet 2 Sig: Take 1 Tab by mouth every eight (8) hours as needed for Nausea. Class: Normal  
 Pharmacy: [address] Ph #: [phone] Route: Oral  
  
Follow-up Instructions Return for double book 95  for INR. Introducing [facility]! Dear Hannah Lao: Thank you for requesting a Tictail account. Our records indicate that you already have an active Tictail account. You can access your account anytime at [email or web address] Did you know that you can access your hospital and ER discharge instructions at any time in MyChart?   You can also review all of your test results from your hospital stay or ER visit. Additional Information If you have questions, please visit the Frequently Asked Questions section of the [facility] website at [email or web address] Remember, [facility] is NOT to be used for urgent needs. For medical emergencies, dial 911. Now available from your iPhone and Android! Please provide this summary of care documentation to your next provider. Your primary care clinician is listed as Augustin Patrick. If you have any questions after today's visit, please call [phone].

## 2018-05-30 NOTE — PROGRESS NOTES
1. Have you been to the ER, urgent care clinic since your last visit? Hospitalized since your last visit? No    2. Have you seen or consulted any other health care providers outside of the [address] [place] [address] since your last visit? Include any pap smears or colon screening. No    Chief Complaint   Patient presents with    Follow-up    Medication Refill    Mass     right side of neck x 2 days, hurts with pushing on it     Pt states she has a mass in right side of neck x 2 days that hurts with pushing on it. She has also stopped taking multiple meds including BP med.

## 2018-05-30 NOTE — PROGRESS NOTES
HISTORY OF PRESENT ILLNESS  Dillan Del Rosario is a 52 y.o. female. HPI  Moved to [place] for the last year with husbands job  She did not want to find a new MD for a year so stopped coumadin and started ASA 325mg  Also out of bp meds and gerd has been severe with nausea at night  Already on prilosec 40mg a day  The FamHx, SocHx, MedHx, Medication, and Allergy lists have been reviewed and updated in the chart. ROS  A comprehensive review of system was obtained and negative except findings in the HPI    Visit Vitals    BP (!) 158/108    Pulse 70    Temp 98.7 °F (37.1 °C) (Oral)    Resp 16    Ht 5' 4\" (1.626 m)    Wt 134 lb (60.8 kg)    SpO2 97%    BMI 23 kg/m2     Physical Exam   Constitutional: She is oriented to person, place, and time. She appears well-developed and well-nourished. Neck: No JVD present. Cardiovascular: Normal rate, regular rhythm and intact distal pulses. Exam reveals no gallop and no friction rub. No murmur heard. Pulmonary/Chest: Effort normal and breath sounds normal. No respiratory distress. She has no wheezes. Musculoskeletal: She exhibits no edema. Neurological: She is alert and oriented to person, place, and time. Skin: Skin is warm. Nursing note and vitals reviewed. ASSESSMENT and PLAN  Encounter Diagnoses   Name Primary?     Takayasu's disease ([facility] [address] [facility] Yes    Mild intermittent asthma without complication     Essential hypertension     Anxiety     Gastroesophageal reflux disease without esophagitis      Orders Placed This Encounter    enoxaparin (LOVENOX) 100 mg/mL    warfarin (COUMADIN) 5 mg tablet    lisinopril (PRINIVIL, ZESTRIL) 20 mg tablet    albuterol (PROVENTIL HFA, VENTOLIN HFA, PROAIR HFA) 90 mcg/actuation inhaler    albuterol sulfate (PROVENTIL;VENTOLIN) 2.5 mg/0.5 mL nebu nebulizer solution    raNITIdine (ZANTAC) 150 mg tablet    ondansetron (ZOFRAN ODT) 4 mg disintegrating tablet     Start Lovenox 100mg daily until therapeutic  Restart at Coumadin 5mg, recheck Friday  Refills updated of all meds   GERD likely worse due to the ASA daily  bp Friday as well    I have discussed the diagnosis with the patient and the intended plan as seen in the above orders. The patient has received an after-visit summary and questions were answered concerning future plans. Patient conveyed understanding of the plan at the time of the visit.     Corbin Marti, MSN, ANP  5/30/2018

## 2018-05-30 NOTE — TELEPHONE ENCOUNTER
Spoke to Jerry Howard and I gave the wrong instructions. I called pt back and informed to take both meds at the same time once each per day and come in on Friday for INR ck. Lovenox is to quickly thin blood until the warfarin can catch up and maintain the INR. Pt verbalized understanding.

## 2018-06-01 ENCOUNTER — OFFICE VISIT (OUTPATIENT)
Dept: FAMILY MEDICINE CLINIC | Age: 48
End: 2018-06-01

## 2018-06-01 VITALS — HEIGHT: 64 IN | DIASTOLIC BLOOD PRESSURE: 70 MMHG | SYSTOLIC BLOOD PRESSURE: 150 MMHG

## 2018-06-01 DIAGNOSIS — Z79.01 ENCOUNTER FOR CURRENT LONG-TERM USE OF ANTICOAGULANTS: Primary | ICD-10-CM

## 2018-06-01 LAB
INR BLD: 1.1
PT POC: 13.7 SECONDS
VALID INTERNAL CONTROL?: YES

## 2018-06-01 NOTE — PROGRESS NOTES
Anticoagulation  Patient here for followup of chronic anticoagulation. Indication: genetic blood clotting disorder. Bleeding Signs/Symptoms:  None. INR's are drawn regularly and have been Subtherapeutic. INR today is  1.2 - just started 2 days ago taking Lovenox as well. Plan: Coumadin dosing-   no change     Recheck INR in 3 day(s)  I have discussed the diagnosis with the patient and the intended plan as seen in the above orders. The patient has received an after-visit summary and questions were answered concerning future plans. Patient conveyed understanding of the plan at the time of the visit.     Meridith Soulier, MSN, ANP  6/1/2018

## 2018-06-01 NOTE — MR AVS SNAPSHOT
[address] [id number] 
[phone] Patient: Faina Patino MRN: [id number] UTR:[birth year] Visit Information Date & Time Provider Department Dept. Phone Encounter #  
 6/1/2018  7:15 AM Jina Esocbar NP [facility] [facility] [phone] [id number] Follow-up Instructions Return in about 3 days (around 6/4/2018) for INR recheck. Upcoming Health Maintenance Date Due Pneumococcal 19-64 Medium Risk (1 of 1 - PPSV23) 6/9/1989 DTaP/Tdap/Td series (1 - Tdap) 6/9/1991 Influenza Age 5 to Adult 8/1/2018 PAP AKA CERVICAL CYTOLOGY 5/3/2020 Allergies as of 6/1/2018  Review Complete On: 6/1/2018 By: Jina Escobar NP Severity Noted Reaction Type Reactions Tetanus Immune Globulin  10/08/2013    Swelling Current Immunizations  Never Reviewed No immunizations on file. Not reviewed this visit You Were Diagnosed With   
  
 Codes Comments Encounter for current long-term use of anticoagulants    -  Primary ICD-10-CM: Z79.01 
ICD-9-CM: V58.61 Vitals BP Height(growth percentile) OB Status Smoking Status 150/70 5' 4\" (1.626 m) Ablation Never Smoker Vitals History Preferred Pharmacy Pharmacy Name Phone [facility] [address] [address] Anitha Mejia [phone] Your Updated Medication List  
  
   
This list is accurate as of 6/1/18  7:48 AM.  Always use your most recent med list.  
  
  
  
  
 * albuterol 90 mcg/actuation inhaler Commonly known as:  PROVENTIL HFA, VENTOLIN HFA, PROAIR HFA Take 2 Puffs by inhalation every four (4) hours as needed for Wheezing or Shortness of Breath. * albuterol sulfate 2.5 mg/0.5 mL Nebu nebulizer solution Commonly known as:  PROVENTIL;VENTOLIN  
0.5 mL by Nebulization route every four (4) hours as needed for Wheezing.   
  
 budesonide-formoterol 160-4.5 mcg/actuation Hfaa Commonly known as:  SYMBICORT Take 2 Puffs by inhalation two (2) times a day. enoxaparin 100 mg/mL Commonly known as:  LOVENOX  
100 mg by SubCUTAneous route daily. eszopiclone 3 mg tablet Commonly known as:  Brendan Flatness Take 1 Tab by mouth nightly. Max Daily Amount: 3 mg.  
  
 lisinopril 20 mg tablet Commonly known as:  PRINIVIL, ZESTRIL  
TAKE ONE TABLET BY MOUTH EVERY DAY  
  
 methadone 10 mg tablet Commonly known as:  DOLOPHINE Take 50 mg by mouth daily. omeprazole 40 mg capsule Commonly known as:  PRILOSEC  
take 1 capsule by mouth once daily  
  
 ondansetron 4 mg disintegrating tablet Commonly known as:  ZOFRAN ODT Take 1 Tab by mouth every eight (8) hours as needed for Nausea. raNITIdine 150 mg tablet Commonly known as:  ZANTAC Take 1 Tab by mouth two (2) times a day. warfarin 5 mg tablet Commonly known as:  COUMADIN Take 1 Tab by mouth daily. * Notice: This list has 2 medication(s) that are the same as other medications prescribed for you. Read the directions carefully, and ask your doctor or other care provider to review them with you. We Performed the Following AMB POC PT/INR [84713 CPT(R)] Follow-up Instructions Return in about 3 days (around 6/4/2018) for INR recheck. [facility]! Dear Darlyn Henson: Thank you for requesting a Pinstant Karma account. Our records indicate that you already have an active [employer] account. You can access your account anytime at [email or web address] Did you know that you can access your hospital and ER discharge instructions at any time in [employer]? You can also review all of your test results from your hospital stay or ER visit. Additional Information If you have questions, please visit the Frequently Asked Questions section of the [employer] website at [email or web address] Remember, [employer] is NOT to be used for urgent needs. For medical emergencies, dial 911. Now available from your iPhone and Android! Please provide this summary of care documentation to your next provider. Your primary care clinician is listed as Anna Sandoval. If you have any questions after today's visit, please call [phone].

## 2018-06-01 NOTE — PROGRESS NOTES
1. Have you been to the ER, urgent care clinic since your last visit? Hospitalized since your last visit? No    2. Have you seen or consulted any other health care providers outside of the [address] [place] [address] since your last visit? Include any pap smears or colon screening.  No    Chief Complaint   Patient presents with    Follow-up     BP ck    Labs     INR ck     INR ck

## 2018-09-24 RX ORDER — WARFARIN 10 MG/1
TABLET ORAL
Qty: 30 TAB | Refills: 0 | Status: SHIPPED | OUTPATIENT
Start: 2018-09-24 | End: 2018-09-28 | Stop reason: SDUPTHER

## 2018-09-28 RX ORDER — WARFARIN 10 MG/1
TABLET ORAL
Qty: 30 TAB | Refills: 2 | Status: SHIPPED | OUTPATIENT
Start: 2018-09-28 | End: 2019-04-12

## 2018-10-03 ENCOUNTER — OFFICE VISIT (OUTPATIENT)
Dept: FAMILY MEDICINE CLINIC | Age: 48
End: 2018-10-03

## 2018-10-03 VITALS
HEIGHT: 64 IN | SYSTOLIC BLOOD PRESSURE: 111 MMHG | DIASTOLIC BLOOD PRESSURE: 65 MMHG | RESPIRATION RATE: 18 BRPM | WEIGHT: 134 LBS | TEMPERATURE: 98.1 F | BODY MASS INDEX: 22.88 KG/M2 | HEART RATE: 73 BPM | OXYGEN SATURATION: 97 %

## 2018-10-03 DIAGNOSIS — H69.83 DYSFUNCTION OF BOTH EUSTACHIAN TUBES: Primary | ICD-10-CM

## 2018-10-03 DIAGNOSIS — I26.99 OTHER PULMONARY EMBOLISM WITHOUT ACUTE COR PULMONALE, UNSPECIFIED CHRONICITY (HCC): ICD-10-CM

## 2018-10-03 DIAGNOSIS — Z51.81 MEDICATION MONITORING ENCOUNTER: ICD-10-CM

## 2018-10-03 LAB
INR BLD: 1.2
PT POC: 14.3 SECONDS
VALID INTERNAL CONTROL?: YES

## 2018-10-03 RX ORDER — WARFARIN 2 MG/1
2 TABLET ORAL DAILY
Qty: 30 TAB | Refills: 0 | Status: SHIPPED | OUTPATIENT
Start: 2018-10-03 | End: 2019-04-10

## 2018-10-03 RX ORDER — METHYLPREDNISOLONE 4 MG/1
TABLET ORAL
Qty: 1 DOSE PACK | Refills: 0 | Status: SHIPPED | OUTPATIENT
Start: 2018-10-03 | End: 2019-04-10 | Stop reason: ALTCHOICE

## 2018-10-03 NOTE — PATIENT INSTRUCTIONS

## 2018-10-03 NOTE — PROGRESS NOTES
Chief Complaint   Patient presents with    Ear Pain     bilateral x 1 week    Sore Throat    Medication Refill     Symbicort     1. Have you been to the ER, urgent care clinic since your last visit? Hospitalized since your last visit? No    2. Have you seen or consulted any other health care providers outside of the [address] [place] [address] since your last visit? Include any pap smears or colon screening.  No    Visit Vitals    /65 (BP 1 Location: Left arm, BP Patient Position: Sitting)    Pulse 73    Temp 98.1 °F (36.7 °C) (Oral)    Resp 18    Ht 5' 4\" (1.626 m)    Wt 134 lb (60.8 kg)    SpO2 97%    BMI 23 kg/m2

## 2018-10-03 NOTE — LETTER
NOTIFICATION RETURN TO WORK / SCHOOL 
 
10/3/2018 9:34 AM 
 
Ms. Dillon [address] [facility] [address] To Whom It May Concern: 
 
Nisha Sparks is currently under the care of [address] She will return to work/school on: 10/3/18 please excuse from work on 10/2/18. If there are questions or concerns please have the patient contact our office. Sincerely, [address] [facility] [address], DO

## 2018-10-03 NOTE — PROGRESS NOTES
Val Gresham is a 50 y.o. female   Chief Complaint   Patient presents with    Ear Pain     bilateral x 1 week    Sore Throat    Medication Refill     Symbicort    pt states she is having b/l ear pain R worse than L L comes and goes. Also with a sore throat she woke up with last night. No fever no chills. Ears bothering for a week along with some dizzy ness and feels like ears are getting worse. Pt is also on coumadin and this has not been checked since February. Will check today  she is a 50y.o. year old female who presents for evalution. Reviewed PmHx, RxHx, FmHx, SocHx, AllgHx and updated and dated in the chart. Review of Systems - negative except as listed above in the HPI    Objective:     Vitals:    10/03/18 0831   BP: 111/65   Pulse: 73   Resp: 18   Temp: 98.1 °F (36.7 °C)   TempSrc: Oral   SpO2: 97%   Weight: 134 lb (60.8 kg)   Height: 5' 4\" (1.626 m)       Current Outpatient Prescriptions   Medication Sig    methylPREDNISolone (MEDROL DOSEPACK) 4 mg tablet Take as directed    warfarin (COUMADIN) 2 mg tablet Take 1 Tab by mouth daily. Take with 10mg daily    warfarin (COUMADIN) 10 mg tablet TAKE 1 TABLET BY MOUTH EVERY DAY    lisinopril (PRINIVIL, ZESTRIL) 20 mg tablet TAKE ONE TABLET BY MOUTH EVERY DAY    albuterol (PROVENTIL HFA, VENTOLIN HFA, PROAIR HFA) 90 mcg/actuation inhaler Take 2 Puffs by inhalation every four (4) hours as needed for Wheezing or Shortness of Breath.  albuterol sulfate (PROVENTIL;VENTOLIN) 2.5 mg/0.5 mL nebu nebulizer solution 0.5 mL by Nebulization route every four (4) hours as needed for Wheezing.  raNITIdine (ZANTAC) 150 mg tablet Take 1 Tab by mouth two (2) times a day.  ondansetron (ZOFRAN ODT) 4 mg disintegrating tablet Take 1 Tab by mouth every eight (8) hours as needed for Nausea.  omeprazole (PRILOSEC) 40 mg capsule take 1 capsule by mouth once daily    methadone (DOLOPHINE) 10 mg tablet Take 50 mg by mouth daily.     eszopiclone (LUNESTA) 3 mg tablet Take 1 Tab by mouth nightly. Max Daily Amount: 3 mg. (Patient taking differently: Take 3 mg by mouth as needed.)    budesonide-formoterol (SYMBICORT) 160-4.5 mcg/actuation HFA inhaler Take 2 Puffs by inhalation two (2) times a day. No current facility-administered medications for this visit. Physical Examination: General appearance - alert, well appearing, and in no distress  Eyes - pupils equal and reactive, extraocular eye movements intact  Ears - fluid behind both TM  Nose - mucosal congestion  Mouth - mucous membranes moist, pharynx normal without lesions  Neck - supple, no significant adenopathy  Chest - clear to auscultation, no wheezes, rales or rhonchi, symmetric air entry  Heart - normal rate, regular rhythm, normal S1, S2, no murmurs, rubs, clicks or gallops      Assessment/ Plan:   Diagnoses and all orders for this visit:    1. Dysfunction of both eustachian tubes  -     methylPREDNISolone (MEDROL DOSEPACK) 4 mg tablet; Take as directed    2. Other pulmonary embolism without acute cor pulmonale, unspecified chronicity (HCC)  -     AMB POC PT/INR  -     warfarin (COUMADIN) 2 mg tablet; Take 1 Tab by mouth daily. Take with 10mg daily    3. Medication monitoring encounter  -     AMB POC PT/INR     1.2 today increase from 10 daily to 12mg daily recheck 7-10 days, pt will check with insurance regarding home machine  Follow-up Disposition:  Return in about 10 days (around 10/13/2018), or if symptoms worsen or fail to improve. I have discussed the diagnosis with the patient and the intended plan as seen in the above orders. The patient has received an after-visit summary and questions were answered concerning future plans. Pt conveyed understanding of plan.     Medication Side Effects and Warnings were discussed with patient      Gabrielle Rolon, DO

## 2019-03-21 RX ORDER — LISINOPRIL 20 MG/1
TABLET ORAL
Qty: 30 TAB | Refills: 0 | Status: SHIPPED | OUTPATIENT
Start: 2019-03-21 | End: 2019-04-16 | Stop reason: SDUPTHER

## 2019-04-10 ENCOUNTER — APPOINTMENT (OUTPATIENT)
Dept: VASCULAR SURGERY | Age: 49
DRG: 948 | End: 2019-04-10
Attending: EMERGENCY MEDICINE
Payer: COMMERCIAL

## 2019-04-10 ENCOUNTER — OFFICE VISIT (OUTPATIENT)
Dept: FAMILY MEDICINE CLINIC | Age: 49
End: 2019-04-10

## 2019-04-10 ENCOUNTER — HOSPITAL ENCOUNTER (OUTPATIENT)
Age: 49
Setting detail: OBSERVATION
Discharge: HOME | DRG: 948 | End: 2019-04-12
Attending: EMERGENCY MEDICINE | Admitting: FAMILY MEDICINE
Payer: COMMERCIAL

## 2019-04-10 VITALS
BODY MASS INDEX: 23.05 KG/M2 | DIASTOLIC BLOOD PRESSURE: 77 MMHG | HEIGHT: 64 IN | WEIGHT: 135 LBS | TEMPERATURE: 98.6 F | SYSTOLIC BLOOD PRESSURE: 187 MMHG | HEART RATE: 81 BPM | OXYGEN SATURATION: 97 % | RESPIRATION RATE: 24 BRPM

## 2019-04-10 DIAGNOSIS — Z79.01 ENCOUNTER FOR CURRENT LONG-TERM USE OF ANTICOAGULANTS: ICD-10-CM

## 2019-04-10 DIAGNOSIS — M79.661 RIGHT CALF PAIN: ICD-10-CM

## 2019-04-10 DIAGNOSIS — Z86.711 HISTORY OF PULMONARY EMBOLISM: ICD-10-CM

## 2019-04-10 DIAGNOSIS — D68.9 COAGULATION DEFICIENCY (HCC): Primary | ICD-10-CM

## 2019-04-10 DIAGNOSIS — I26.99 OTHER PULMONARY EMBOLISM WITHOUT ACUTE COR PULMONALE, UNSPECIFIED CHRONICITY (HCC): ICD-10-CM

## 2019-04-10 DIAGNOSIS — R79.1 SUPRATHERAPEUTIC INR: Primary | ICD-10-CM

## 2019-04-10 DIAGNOSIS — M31.4 TAKAYASU'S DISEASE (HCC): ICD-10-CM

## 2019-04-10 LAB
AMPHET UR QL SCN: NEGATIVE
ANION GAP SERPL CALC-SCNC: 7 MMOL/L (ref 5–15)
APPEARANCE UR: CLEAR
BACTERIA URNS QL MICRO: NEGATIVE /HPF
BARBITURATES UR QL SCN: NEGATIVE
BASOPHILS # BLD: 0.1 K/UL (ref 0–0.1)
BASOPHILS NFR BLD: 1 % (ref 0–1)
BENZODIAZ UR QL: NEGATIVE
BILIRUB UR QL: NEGATIVE
BUN SERPL-MCNC: 6 MG/DL (ref 6–20)
BUN/CREAT SERPL: 8 (ref 12–20)
CALCIUM SERPL-MCNC: 9.2 MG/DL (ref 8.5–10.1)
CANNABINOIDS UR QL SCN: NEGATIVE
CHLORIDE SERPL-SCNC: 106 MMOL/L (ref 97–108)
CO2 SERPL-SCNC: 25 MMOL/L (ref 21–32)
COCAINE UR QL SCN: NEGATIVE
COLOR UR: ABNORMAL
COMMENT, HOLDF: NORMAL
CREAT SERPL-MCNC: 0.77 MG/DL (ref 0.55–1.02)
DIFFERENTIAL METHOD BLD: NORMAL
DRUG SCRN COMMENT,DRGCM: ABNORMAL
EOSINOPHIL # BLD: 0.1 K/UL (ref 0–0.4)
EOSINOPHIL NFR BLD: 1 % (ref 0–7)
EPITH CASTS URNS QL MICRO: ABNORMAL /LPF
ERYTHROCYTE [DISTWIDTH] IN BLOOD BY AUTOMATED COUNT: 13.2 % (ref 11.5–14.5)
GLUCOSE SERPL-MCNC: 151 MG/DL (ref 65–100)
GLUCOSE UR STRIP.AUTO-MCNC: NEGATIVE MG/DL
HCT VFR BLD AUTO: 40.1 % (ref 35–47)
HGB BLD-MCNC: 13.2 G/DL (ref 11.5–16)
HGB UR QL STRIP: ABNORMAL
HYALINE CASTS URNS QL MICRO: ABNORMAL /LPF (ref 0–5)
IMM GRANULOCYTES # BLD AUTO: 0 K/UL (ref 0–0.04)
IMM GRANULOCYTES NFR BLD AUTO: 0 % (ref 0–0.5)
INR BLD: 8
INR PPP: >12.3 (ref 0.9–1.1)
INR PPP: >12.3 (ref 0.9–1.1)
KETONES UR QL STRIP.AUTO: NEGATIVE MG/DL
LEUKOCYTE ESTERASE UR QL STRIP.AUTO: ABNORMAL
LYMPHOCYTES # BLD: 1.7 K/UL (ref 0.8–3.5)
LYMPHOCYTES NFR BLD: 17 % (ref 12–49)
MCH RBC QN AUTO: 28.6 PG (ref 26–34)
MCHC RBC AUTO-ENTMCNC: 32.9 G/DL (ref 30–36.5)
MCV RBC AUTO: 87 FL (ref 80–99)
METHADONE UR QL: POSITIVE
MONOCYTES # BLD: 0.6 K/UL (ref 0–1)
MONOCYTES NFR BLD: 6 % (ref 5–13)
NEUTS SEG # BLD: 7.5 K/UL (ref 1.8–8)
NEUTS SEG NFR BLD: 75 % (ref 32–75)
NITRITE UR QL STRIP.AUTO: NEGATIVE
NRBC # BLD: 0 K/UL (ref 0–0.01)
NRBC BLD-RTO: 0 PER 100 WBC
OPIATES UR QL: POSITIVE
PCP UR QL: NEGATIVE
PH UR STRIP: 6 [PH] (ref 5–8)
PLATELET # BLD AUTO: 343 K/UL (ref 150–400)
PMV BLD AUTO: 9.5 FL (ref 8.9–12.9)
POTASSIUM SERPL-SCNC: 3.6 MMOL/L (ref 3.5–5.1)
PROT UR STRIP-MCNC: NEGATIVE MG/DL
PROTHROMBIN TIME: 115 SEC (ref 9–11.1)
PROTHROMBIN TIME: >120 SEC (ref 9–11.1)
PT POC: NORMAL SECONDS
RBC # BLD AUTO: 4.61 M/UL (ref 3.8–5.2)
RBC #/AREA URNS HPF: ABNORMAL /HPF (ref 0–5)
SAMPLES BEING HELD,HOLD: NORMAL
SODIUM SERPL-SCNC: 138 MMOL/L (ref 136–145)
SP GR UR REFRACTOMETRY: 1.01 (ref 1–1.03)
UR CULT HOLD, URHOLD: NORMAL
UROBILINOGEN UR QL STRIP.AUTO: 1 EU/DL (ref 0.2–1)
VALID INTERNAL CONTROL?: YES
WBC # BLD AUTO: 10 K/UL (ref 3.6–11)
WBC URNS QL MICRO: ABNORMAL /HPF (ref 0–4)

## 2019-04-10 PROCEDURE — 36415 COLL VENOUS BLD VENIPUNCTURE: CPT

## 2019-04-10 PROCEDURE — 81001 URINALYSIS AUTO W/SCOPE: CPT

## 2019-04-10 PROCEDURE — 74011250637 HC RX REV CODE- 250/637: Performed by: EMERGENCY MEDICINE

## 2019-04-10 PROCEDURE — 80307 DRUG TEST PRSMV CHEM ANLYZR: CPT

## 2019-04-10 PROCEDURE — 99284 EMERGENCY DEPT VISIT MOD MDM: CPT

## 2019-04-10 PROCEDURE — 85610 PROTHROMBIN TIME: CPT

## 2019-04-10 PROCEDURE — 80048 BASIC METABOLIC PNL TOTAL CA: CPT

## 2019-04-10 PROCEDURE — 65270000029 HC RM PRIVATE

## 2019-04-10 PROCEDURE — 74011250637 HC RX REV CODE- 250/637: Performed by: FAMILY MEDICINE

## 2019-04-10 PROCEDURE — 85025 COMPLETE CBC W/AUTO DIFF WBC: CPT

## 2019-04-10 PROCEDURE — 93971 EXTREMITY STUDY: CPT

## 2019-04-10 PROCEDURE — 65660000000 HC RM CCU STEPDOWN

## 2019-04-10 PROCEDURE — 99218 HC RM OBSERVATION: CPT

## 2019-04-10 RX ORDER — SODIUM CHLORIDE 0.9 % (FLUSH) 0.9 %
5-40 SYRINGE (ML) INJECTION AS NEEDED
Status: DISCONTINUED | OUTPATIENT
Start: 2019-04-10 | End: 2019-04-12 | Stop reason: HOSPADM

## 2019-04-10 RX ORDER — SODIUM CHLORIDE 0.9 % (FLUSH) 0.9 %
5-40 SYRINGE (ML) INJECTION EVERY 8 HOURS
Status: DISCONTINUED | OUTPATIENT
Start: 2019-04-10 | End: 2019-04-12 | Stop reason: HOSPADM

## 2019-04-10 RX ORDER — ESZOPICLONE 3 MG/1
3 TABLET, FILM COATED ORAL
COMMUNITY

## 2019-04-10 RX ORDER — HYDROCODONE BITARTRATE AND ACETAMINOPHEN 5; 325 MG/1; MG/1
1 TABLET ORAL ONCE
Status: COMPLETED | OUTPATIENT
Start: 2019-04-10 | End: 2019-04-10

## 2019-04-10 RX ORDER — HYDROCODONE BITARTRATE AND ACETAMINOPHEN 5; 325 MG/1; MG/1
1 TABLET ORAL
Status: COMPLETED | OUTPATIENT
Start: 2019-04-10 | End: 2019-04-10

## 2019-04-10 RX ADMIN — PHYTONADIONE 5 MG: 10 INJECTION, EMULSION INTRAMUSCULAR; INTRAVENOUS; SUBCUTANEOUS at 19:51

## 2019-04-10 RX ADMIN — Medication 10 ML: at 22:00

## 2019-04-10 RX ADMIN — HYDROCODONE BITARTRATE AND ACETAMINOPHEN 1 TABLET: 5; 325 TABLET ORAL at 22:13

## 2019-04-10 RX ADMIN — HYDROCODONE BITARTRATE AND ACETAMINOPHEN 1 TABLET: 5; 325 TABLET ORAL at 17:21

## 2019-04-10 NOTE — ED NOTES
6:52 PM 
Care of patient signed over to Pastor Pretty MD.  Bedside handoff complete. CONSULT NOTE: 
7:25 PM Pastor Pretty MD spoke with Resident, Consult for [facility]. Discussed available diagnostic tests and clinical findings. Resident will evaluate and admit patient.

## 2019-04-10 NOTE — ED NOTES
Bedside and Verbal shift change report given to Odell Ramirez (oncoming nurse) by [facility]  (offgoing nurse). Report included the following information SBAR, ED Summary and MAR.

## 2019-04-10 NOTE — ED NOTES
Informed MD Dykes that pt INR level was elevated, md unassigned self because PIT MD, other ED MD Severino informed.

## 2019-04-10 NOTE — ED TRIAGE NOTES
Complains of right lower leg pain for the past 3 days increased today. Denies injury. States she has history of blood clots.

## 2019-04-10 NOTE — PROGRESS NOTES
Patient ehre for right calf pain, 10/10. Can not walk. Patient also is on coumadin 10 mg daily, but states she has not taken in 2 days due to stress of right leg pain. 1. Have you been to the ER, urgent care clinic since your last visit? Hospitalized since your last visit? No 
 
2. Have you seen or consulted any other health care providers outside of the [address] [place] [address] since your last visit? Include any pap smears or colon screening. No  
Results for orders placed or performed in visit on 04/10/19 AMB POC PT/INR Result Value Ref Range VALID INTERNAL CONTROL POC Yes Prothrombin time (POC)  seconds INR POC 8.0 Chief Complaint Patient presents with  Leg Pain  
  right calf pain x 2 nights ago, can not walk on it.  Coagulation disorder  
  has not taken coumadin x 2 days She is a 50 y.o. female who presents for evalution. Reviewed PmHx, RxHx, FmHx, SocHx, AllgHx and updated and dated in the chart. Patient Active Problem List  
 Diagnosis  Supratherapeutic INR  
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  
 
Vitals:  
 04/10/19 1415 BP: 187/77 Pulse: 81 Resp: 24 Temp: 98.6 °F (37 °C) SpO2: 97% Weight: 135 lb (61.2 kg) Height: 5' 4\" (1.626 m) Physical Examination: General appearance - alert, well appearing, and in no distress R calf extremely tender, cool to touch Assessment/ Plan:  
Diagnoses and all orders for this visit: 1. Coagulation deficiency ([address] -     AMB POC PT/INR>8 2. R calf pain: 
-severe pain  
-concern so  took stat to ER for work up Follow-up and Dispositions · Return if symptoms worsen or fail to improve. I have discussed the diagnosis with the patient and the intended plan as seen in the above orders. The patient understands and agrees with the plan. The patient has received an after-visit summary and questions were answered concerning future plans. Medication Side Effects and Warnings were discussed with patient Patient Labs were reviewed and or requested: 
Patient Past Records were reviewed and or requested Luis Hendricks M.D. There are no Patient Instructions on file for this visit.

## 2019-04-10 NOTE — ED PROVIDER NOTES
50 y.o. female with past medical history significant for HTN, PE, Takayasu's arteritis, and neuropathy who presents via private vehicle from home accompanied by her  with chief complaint of leg pain. Patient reports awakening 2 days ago with lower right leg pain radiating up to her right thigh, exacerbated with ambulation, movement, and pressure. Patient endorses Hx of PE for which she takes coumadin 10mg - states she has not taken it for the past 2 nights d/t severity of pain. Patient denies leg swelling. Patient denies recent precipitating injuries or falls. Patient denies fevers and chills, but notes some diaphoresis d/t pain. Patient reports taking 650mg ibuprofen PRN for present symptoms. No rash. There are no other acute medical concerns at this time. Social hx: Never tobacco smoker; Denies EtOH use; Denies illicit drug use PCP: Willie Franco NP Note written by Armand Medina, as dictated by Candiss Merlin, MD 4:30 PM 
 
The history is provided by the patient. No  was used. Past Medical History:  
Diagnosis Date  Bipolar 1 disorder ([address]  GERD (gastroesophageal reflux disease)  Hypertension  Neuropathy  Ovarian cyst   
 PE (pulmonary embolism)  Presbyesophagus  Reflux  Spinal artery aneurysm ([address]  [address]ay[address]'s arteritis ([facility] Past Surgical History:  
Procedure Laterality Date  HX APPENDECTOMY  HX GYN    
 C-sections  HX GYN    
 ablation Family History:  
Problem Relation Age of Onset  Heart Disease Mother  Stroke Father  Heart Disease Maternal Grandmother  Stroke Paternal Grandmother  Cancer Paternal Grandmother Social History Socioeconomic History  Marital status:  Spouse name: Not on file  Number of children: Not on file  Years of education: Not on file  Highest education level: Not on file Occupational History  Not on file Social Needs  Financial resource strain: Not on file  Food insecurity:  
  Worry: Not on file Inability: Not on file  Transportation needs:  
  Medical: Not on file Non-medical: Not on file Tobacco Use  Smoking status: Never Smoker  Smokeless tobacco: Never Used Substance and Sexual Activity  Alcohol use: No  
  Alcohol/week: 0.0 oz  Drug use: No  
 Sexual activity: Yes  
  Partners: Male Lifestyle  Physical activity:  
  Days per week: Not on file Minutes per session: Not on file  Stress: Not on file Relationships  Social connections:  
  Talks on phone: Not on file Gets together: Not on file Attends Jainism service: Not on file Active member of club or organization: Not on file Attends meetings of clubs or organizations: Not on file Relationship status: Not on file  Intimate partner violence:  
  Fear of current or ex partner: Not on file Emotionally abused: Not on file Physically abused: Not on file Forced sexual activity: Not on file Other Topics Concern  Not on file Social History Narrative  Not on file ALLERGIES: Tetanus immune globulin Review of Systems Constitutional: Positive for diaphoresis. Negative for chills and fever. Respiratory: Negative for shortness of breath. Cardiovascular: Negative for chest pain and leg swelling. Gastrointestinal: Negative for abdominal pain, constipation, diarrhea and vomiting. Musculoskeletal:  
     +right lower leg pain radiating up to right thigh Skin: Negative for rash. Neurological: Negative for dizziness and light-headedness. All other systems reviewed and are negative. Vitals:  
 04/12/19 0743 04/12/19 1130 04/12/19 1349 04/12/19 1500 BP: 135/60 157/71 137/70 Pulse: 60 63 70 68 Resp: 18 18 18 Temp: 97.7 °F (36.5 °C) 97.5 °F (36.4 °C) 97.6 °F (36.4 °C) SpO2: 96% 97% 98% Weight:      
Height:      
      
 
Physical Exam Constitutional: She is oriented to person, place, and time. She appears well-developed and well-nourished. HENT:  
Head: Normocephalic and atraumatic. Eyes: No scleral icterus. Neck: Normal range of motion. Cardiovascular: Normal rate. Pulmonary/Chest: Effort normal.  
Abdominal: She exhibits no distension. Neurological: She is alert and oriented to person, place, and time. Skin: No rash noted. No erythema. Nursing note and vitals reviewed. Note written by Armand Martinez, as dictated by Deetta Primrose, MD 4:30 PM 
  
 
MDM Procedures Pt signed out to Dr. Andre Romero for additional management and likely admission for supratherapeutic INR. Reviewed presentation, results, and pending tests.

## 2019-04-11 ENCOUNTER — APPOINTMENT (OUTPATIENT)
Dept: VASCULAR SURGERY | Age: 49
DRG: 948 | End: 2019-04-11
Attending: FAMILY MEDICINE
Payer: COMMERCIAL

## 2019-04-11 LAB
ALBUMIN SERPL-MCNC: 3.3 G/DL (ref 3.5–5)
ALBUMIN/GLOB SERPL: 0.9 {RATIO} (ref 1.1–2.2)
ALP SERPL-CCNC: 114 U/L (ref 45–117)
ALT SERPL-CCNC: 16 U/L (ref 12–78)
ANION GAP SERPL CALC-SCNC: 2 MMOL/L (ref 5–15)
AST SERPL-CCNC: 15 U/L (ref 15–37)
BASOPHILS # BLD: 0.1 K/UL (ref 0–0.1)
BASOPHILS NFR BLD: 0 % (ref 0–1)
BILIRUB SERPL-MCNC: 0.4 MG/DL (ref 0.2–1)
BUN SERPL-MCNC: 7 MG/DL (ref 6–20)
BUN/CREAT SERPL: 11 (ref 12–20)
CALCIUM SERPL-MCNC: 8.6 MG/DL (ref 8.5–10.1)
CHLORIDE SERPL-SCNC: 108 MMOL/L (ref 97–108)
CO2 SERPL-SCNC: 28 MMOL/L (ref 21–32)
CREAT SERPL-MCNC: 0.64 MG/DL (ref 0.55–1.02)
DIFFERENTIAL METHOD BLD: ABNORMAL
EOSINOPHIL # BLD: 0.3 K/UL (ref 0–0.4)
EOSINOPHIL NFR BLD: 2 % (ref 0–7)
ERYTHROCYTE [DISTWIDTH] IN BLOOD BY AUTOMATED COUNT: 13.2 % (ref 11.5–14.5)
ERYTHROCYTE [SEDIMENTATION RATE] IN BLOOD: 21 MM/HR (ref 0–20)
GLOBULIN SER CALC-MCNC: 3.7 G/DL (ref 2–4)
GLUCOSE SERPL-MCNC: 100 MG/DL (ref 65–100)
HCT VFR BLD AUTO: 36.8 % (ref 35–47)
HGB BLD-MCNC: 12.1 G/DL (ref 11.5–16)
IMM GRANULOCYTES # BLD AUTO: 0 K/UL (ref 0–0.04)
IMM GRANULOCYTES NFR BLD AUTO: 0 % (ref 0–0.5)
INR PPP: 4.7 (ref 0.9–1.1)
INR PPP: 9.1 (ref 0.9–1.1)
LYMPHOCYTES # BLD: 3.5 K/UL (ref 0.8–3.5)
LYMPHOCYTES NFR BLD: 25 % (ref 12–49)
MCH RBC QN AUTO: 29 PG (ref 26–34)
MCHC RBC AUTO-ENTMCNC: 32.9 G/DL (ref 30–36.5)
MCV RBC AUTO: 88.2 FL (ref 80–99)
MONOCYTES # BLD: 1.2 K/UL (ref 0–1)
MONOCYTES NFR BLD: 8 % (ref 5–13)
NEUTS SEG # BLD: 9 K/UL (ref 1.8–8)
NEUTS SEG NFR BLD: 65 % (ref 32–75)
NRBC # BLD: 0 K/UL (ref 0–0.01)
NRBC BLD-RTO: 0 PER 100 WBC
PLATELET # BLD AUTO: 305 K/UL (ref 150–400)
PMV BLD AUTO: 9.5 FL (ref 8.9–12.9)
POTASSIUM SERPL-SCNC: 3.5 MMOL/L (ref 3.5–5.1)
PROT SERPL-MCNC: 7 G/DL (ref 6.4–8.2)
PROTHROMBIN TIME: 44 SEC (ref 9–11.1)
PROTHROMBIN TIME: 81.8 SEC (ref 9–11.1)
RBC # BLD AUTO: 4.17 M/UL (ref 3.8–5.2)
SODIUM SERPL-SCNC: 138 MMOL/L (ref 136–145)
WBC # BLD AUTO: 14 K/UL (ref 3.6–11)

## 2019-04-11 PROCEDURE — 85025 COMPLETE CBC W/AUTO DIFF WBC: CPT

## 2019-04-11 PROCEDURE — 74011250636 HC RX REV CODE- 250/636

## 2019-04-11 PROCEDURE — 96376 TX/PRO/DX INJ SAME DRUG ADON: CPT

## 2019-04-11 PROCEDURE — 74011250636 HC RX REV CODE- 250/636: Performed by: FAMILY MEDICINE

## 2019-04-11 PROCEDURE — 80053 COMPREHEN METABOLIC PANEL: CPT

## 2019-04-11 PROCEDURE — 85652 RBC SED RATE AUTOMATED: CPT

## 2019-04-11 PROCEDURE — 85610 PROTHROMBIN TIME: CPT

## 2019-04-11 PROCEDURE — 96374 THER/PROPH/DIAG INJ IV PUSH: CPT

## 2019-04-11 PROCEDURE — 74011250637 HC RX REV CODE- 250/637: Performed by: STUDENT IN AN ORGANIZED HEALTH CARE EDUCATION/TRAINING PROGRAM

## 2019-04-11 PROCEDURE — 65660000000 HC RM CCU STEPDOWN

## 2019-04-11 PROCEDURE — 36415 COLL VENOUS BLD VENIPUNCTURE: CPT

## 2019-04-11 PROCEDURE — 93926 LOWER EXTREMITY STUDY: CPT

## 2019-04-11 PROCEDURE — 99218 HC RM OBSERVATION: CPT

## 2019-04-11 PROCEDURE — 74011250637 HC RX REV CODE- 250/637: Performed by: FAMILY MEDICINE

## 2019-04-11 RX ORDER — HYDROMORPHONE HYDROCHLORIDE 2 MG/ML
1 INJECTION, SOLUTION INTRAMUSCULAR; INTRAVENOUS; SUBCUTANEOUS ONCE
Status: COMPLETED | OUTPATIENT
Start: 2019-04-11 | End: 2019-04-11

## 2019-04-11 RX ORDER — METHADONE HYDROCHLORIDE 10 MG/ML
31 CONCENTRATE ORAL DAILY
Status: DISCONTINUED | OUTPATIENT
Start: 2019-04-11 | End: 2019-04-12 | Stop reason: HOSPADM

## 2019-04-11 RX ORDER — ACETAMINOPHEN 325 MG/1
650 TABLET ORAL
Status: DISCONTINUED | OUTPATIENT
Start: 2019-04-11 | End: 2019-04-12 | Stop reason: HOSPADM

## 2019-04-11 RX ORDER — METHADONE HYDROCHLORIDE 10 MG/ML
31 CONCENTRATE ORAL DAILY
Status: DISCONTINUED | OUTPATIENT
Start: 2019-04-11 | End: 2019-04-11

## 2019-04-11 RX ORDER — CYCLOBENZAPRINE HCL 10 MG
10 TABLET ORAL ONCE
Status: COMPLETED | OUTPATIENT
Start: 2019-04-11 | End: 2019-04-11

## 2019-04-11 RX ORDER — PANTOPRAZOLE SODIUM 40 MG/1
40 TABLET, DELAYED RELEASE ORAL
Status: DISCONTINUED | OUTPATIENT
Start: 2019-04-11 | End: 2019-04-12 | Stop reason: HOSPADM

## 2019-04-11 RX ORDER — LISINOPRIL 20 MG/1
20 TABLET ORAL DAILY
Status: DISCONTINUED | OUTPATIENT
Start: 2019-04-11 | End: 2019-04-12 | Stop reason: HOSPADM

## 2019-04-11 RX ORDER — HYDROMORPHONE HYDROCHLORIDE 2 MG/ML
INJECTION, SOLUTION INTRAMUSCULAR; INTRAVENOUS; SUBCUTANEOUS
Status: DISCONTINUED
Start: 2019-04-11 | End: 2019-04-11

## 2019-04-11 RX ORDER — ACETAMINOPHEN 325 MG/1
650 TABLET ORAL
Status: DISCONTINUED | OUTPATIENT
Start: 2019-04-11 | End: 2019-04-11 | Stop reason: SDUPTHER

## 2019-04-11 RX ORDER — HYDROMORPHONE HYDROCHLORIDE 2 MG/ML
0.5 INJECTION, SOLUTION INTRAMUSCULAR; INTRAVENOUS; SUBCUTANEOUS ONCE
Status: COMPLETED | OUTPATIENT
Start: 2019-04-11 | End: 2019-04-11

## 2019-04-11 RX ADMIN — Medication 10 ML: at 21:21

## 2019-04-11 RX ADMIN — ACETAMINOPHEN 650 MG: 325 TABLET ORAL at 21:25

## 2019-04-11 RX ADMIN — CYCLOBENZAPRINE HYDROCHLORIDE 10 MG: 10 TABLET, FILM COATED ORAL at 06:45

## 2019-04-11 RX ADMIN — LISINOPRIL 20 MG: 20 TABLET ORAL at 08:09

## 2019-04-11 RX ADMIN — HYDROMORPHONE HYDROCHLORIDE 1 MG: 2 INJECTION INTRAMUSCULAR; INTRAVENOUS; SUBCUTANEOUS at 06:20

## 2019-04-11 RX ADMIN — PANTOPRAZOLE SODIUM 40 MG: 40 TABLET, DELAYED RELEASE ORAL at 06:21

## 2019-04-11 RX ADMIN — Medication 10 ML: at 12:51

## 2019-04-11 RX ADMIN — HYDROMORPHONE HYDROCHLORIDE 0.5 MG: 2 INJECTION, SOLUTION INTRAMUSCULAR; INTRAVENOUS; SUBCUTANEOUS at 01:47

## 2019-04-11 RX ADMIN — ACETAMINOPHEN 650 MG: 325 TABLET ORAL at 15:18

## 2019-04-11 RX ADMIN — METHADONE HYDROCHLORIDE 31 MG: 10 CONCENTRATE ORAL at 11:14

## 2019-04-11 RX ADMIN — Medication 10 ML: at 06:20

## 2019-04-11 RX ADMIN — CYCLOBENZAPRINE HYDROCHLORIDE 10 MG: 10 TABLET, FILM COATED ORAL at 03:43

## 2019-04-11 NOTE — PROGRESS NOTES
Per [facility] pt is seen there and is currently on methadone 31 mg daily. She was seen there last on 4/6 when she was prescribed 6 doses with last dose for today 4/11. Pt has an appointment to be seen there tomorrow. [place] to fax report with details.   
 
Zarina Gibson DO

## 2019-04-11 NOTE — PROGRESS NOTES
[facility] FAMILY MEDICINE RESIDENCY PROGRAM  
Daily Progress Note Date: 4/11/2019 Assessment/Plan:  
Karthikeyan Fuller is a 50 y.o. female with Takayasu's arteritis (not on any treatment currently), hx of PE (1996, 2003, 2009) (on coumadin), chronic pain (on methadone), HTN who presents to the ED from PCP's office due to supra therapeutic INR. ON: Poor pain control overnight requiring dilaudid and flexeril. 1. Supratherapeutic INR/ R. Calf pain: POA. INR 12.3 on admission. INR improved to 9.1 this morning. Likely 2/2 takayasu arteritis. Pt has no signs of active bleeding at all. s/p PO vitamin K 5mg x1. Bilateral LE venous doppler neg for DVT. - ESR only mildly elevated - f/u LE arterial dopplers - Heme/onc consulted, appreciate recs - Watch for any signs of bleeding given high INR.  
  
2. HTN:  Continue home lisinopril 20mg daily. 
  
3. Chronic pain: Hold home methadone 31mg daily until pharmacy able to confirm methadone dose in AM. FEN/GI - low Na diet Activity - As tolerated DVT prophylaxis - hold GI prophylaxis -  protonix Disposition - TBD 
 
CODE STATUS:  FULL CODE Patient seen and discussed with Dr. Ervin Hess, attending physician. Starr Trotter MD 
Family Medicine Resident CC: \"My calf hurts\" Subjective Poor pain control ON requiring dilaudid and flexeril. Pain slightly improved this morning. Denies chills, headaches, chest pain, shortness of breath, palpitations, abdominal pain, nausea and vomiting, and LE edema. Inpatient Medications Current Facility-Administered Medications Medication Dose Route Frequency  lisinopril (PRINIVIL, ZESTRIL) tablet 20 mg  20 mg Oral DAILY  pantoprazole (PROTONIX) tablet 40 mg  40 mg Oral ACB  sodium chloride (NS) flush 5-40 mL  5-40 mL IntraVENous Q8H  
 sodium chloride (NS) flush 5-40 mL  5-40 mL IntraVENous PRN Current Outpatient Medications Medication Sig  
 eszopiclone (LUNESTA) 3 mg tablet Take 3 mg by mouth nightly as needed for Sleep.  lisinopril (PRINIVIL, ZESTRIL) 20 mg tablet TAKE 1 TABLET BY MOUTH EVERY DAY  omeprazole (PRILOSEC) 40 mg capsule TAKE 1 CAPSULE BY MOUTH EVERY DAY  warfarin (COUMADIN) 10 mg tablet TAKE 1 TABLET BY MOUTH EVERY DAY  albuterol (PROVENTIL HFA, VENTOLIN HFA, PROAIR HFA) 90 mcg/actuation inhaler Take 2 Puffs by inhalation every four (4) hours as needed for Wheezing or Shortness of Breath.  albuterol sulfate (PROVENTIL;VENTOLIN) 2.5 mg/0.5 mL nebu nebulizer solution 0.5 mL by Nebulization route every four (4) hours as needed for Wheezing.  methadone (DOLOPHINE) 10 mg/mL solution Take 31 mg by mouth daily. Allergies Allergies Allergen Reactions  Tetanus Immune Globulin Swelling Objective Vitals: 
Patient Vitals for the past 8 hrs: 
 Temp Pulse Resp BP SpO2  
04/11/19 0808  64  157/49   
04/11/19 0712 98 °F (36.7 °C) 64 14 167/59 98 % 04/11/19 0400  (!) 59 11 166/68 98 % 04/11/19 0300  60 10 187/74 98 % I/O: 
No intake or output data in the 24 hours ending 04/11/19 0835 Last shift: 
  No intake/output data recorded. Last 3 shifts: 
  No intake/output data recorded. Physical Exam: 
General: No acute distress. Alert. Cooperative. Head: Normocephalic. Atraumatic. Eyes:  Conjunctiva pink. Sclera white. PERRL. Nose:  Septum midline. Mucosa pink. No drainage. Throat: Mucosa pink. Moist mucous membranes. No tonsillar exudates or erythema. Palate movement equal bilaterally. Respiratory: CTAB. No w/r/r/c.  
Cardiovascular: RRR. Bilateral carotid bruits present. 4/6 murmur present and loudest over L. Sternal border. GI: + bowel sounds. Nontender. No rebound tenderness or guarding. Nondistended Extremities: Tenderness to palpation in RLE (calf). No swelling, erythema or edema noted in bilateral LE. Dorsalis pedis and posterior tibial pulses 2+ bilaterally. Skin is soft to touch. Laboratory Data Recent Results (from the past 12 hour(s)) METABOLIC PANEL, COMPREHENSIVE Collection Time: 04/11/19  2:00 AM  
Result Value Ref Range Sodium 138 136 - 145 mmol/L Potassium 3.5 3.5 - 5.1 mmol/L Chloride 108 97 - 108 mmol/L  
 CO2 28 21 - 32 mmol/L Anion gap 2 (L) 5 - 15 mmol/L Glucose 100 65 - 100 mg/dL BUN 7 6 - 20 MG/DL Creatinine 0.64 0.55 - 1.02 MG/DL  
 BUN/Creatinine ratio 11 (L) 12 - 20 GFR est AA >60 >60 ml/min/1.73m2 GFR est non-AA >60 >60 ml/min/1.73m2 Calcium 8.6 8.5 - 10.1 MG/DL Bilirubin, total 0.4 0.2 - 1.0 MG/DL  
 ALT (SGPT) 16 12 - 78 U/L  
 AST (SGOT) 15 15 - 37 U/L Alk. phosphatase 114 45 - 117 U/L Protein, total 7.0 6.4 - 8.2 g/dL Albumin 3.3 (L) 3.5 - 5.0 g/dL Globulin 3.7 2.0 - 4.0 g/dL A-G Ratio 0.9 (L) 1.1 - 2.2    
CBC WITH AUTOMATED DIFF Collection Time: 04/11/19  2:00 AM  
Result Value Ref Range WBC 14.0 (H) 3.6 - 11.0 K/uL  
 RBC 4.17 3.80 - 5.20 M/uL  
 HGB 12.1 11.5 - 16.0 g/dL HCT 36.8 35.0 - 47.0 % MCV 88.2 80.0 - 99.0 FL  
 MCH 29.0 26.0 - 34.0 PG  
 MCHC 32.9 30.0 - 36.5 g/dL  
 RDW 13.2 11.5 - 14.5 % PLATELET 961 622 - 363 K/uL MPV 9.5 8.9 - 12.9 FL  
 NRBC 0.0 0  WBC ABSOLUTE NRBC 0.00 0.00 - 0.01 K/uL NEUTROPHILS 65 32 - 75 % LYMPHOCYTES 25 12 - 49 % MONOCYTES 8 5 - 13 % EOSINOPHILS 2 0 - 7 % BASOPHILS 0 0 - 1 % IMMATURE GRANULOCYTES 0 0.0 - 0.5 % ABS. NEUTROPHILS 9.0 (H) 1.8 - 8.0 K/UL  
 ABS. LYMPHOCYTES 3.5 0.8 - 3.5 K/UL  
 ABS. MONOCYTES 1.2 (H) 0.0 - 1.0 K/UL  
 ABS. EOSINOPHILS 0.3 0.0 - 0.4 K/UL  
 ABS. BASOPHILS 0.1 0.0 - 0.1 K/UL  
 ABS. IMM. GRANS. 0.0 0.00 - 0.04 K/UL  
 DF AUTOMATED PROTHROMBIN TIME + INR Collection Time: 04/11/19  2:00 AM  
Result Value Ref Range INR 9.1 (HH) 0.9 - 1.1 Prothrombin time 81.8 (H) 9.0 - 11.1 sec SED RATE (ESR) Collection Time: 04/11/19  2:00 AM  
Result Value Ref Range Sed rate, automated 21 (H) 0 - 20 mm/hr Hospital Problems: 
Hospital Problems  Date Reviewed: 10/3/2018 Codes Class Noted POA Supratherapeutic INR ICD-10-CM: R79.1 ICD-9-CM: 790.92  4/10/2019 Unknown

## 2019-04-11 NOTE — ED NOTES
Bedside and Verbal shift change report given to Silvina Vidal (oncoming nurse) by Caryle Spring  (offgoing nurse). Report included the following information SBAR, Kardex, ED Summary, MAR, Recent Results and Cardiac Rhythm NSR.

## 2019-04-11 NOTE — PROGRESS NOTES
Bedside and Verbal shift change report given to Shannon Fisher (oncoming nurse) by Carrillo Bonner (offgoing nurse). Report included the following information SBAR, Kardex, Intake/Output, MAR, Recent Results and Cardiac Rhythm NSR.

## 2019-04-11 NOTE — PROGRESS NOTES
Received patient in 9/10 pain. Patient had received Norco 5 325 earlier with little relief. Called Family Practice and received one time order for dilauded 0.5mg IV. Patient 7/10 pain. Called Family Practice and received flexeril 10mg PO. Patient claims better at 6/10. 
 
0605 patient rates 9/10 pain. Called Family Practice and awaiting orders. Dilauded 1mg given IV. Flexeril 10mg PO given. Patient remembers hurting leg by pushing a trash can a few days ago. Wonders if this might be cause?

## 2019-04-11 NOTE — PROGRESS NOTES
BS[facility]: MED RECONCILIATION Comments/Recommendations:  
Patient alert and oriented for medication reconciliation, knowledgeable regarding medications. Patient only took methadone this morning due to not being able to walk and reach her other medications. Other medications last taken on Sunday 4/7/19. Patient receives methadone from [facility] [phone] and states she is on methadone for chronic pain management and is being weaned down/off. Pharmacy will confirm dosing and indication tomorrow morning when clinic opens, patient had her dose this morning. Patient on warfarin 10mg daily, states she has been on this regimen for ~1 year. She does not get her INR checked regularly due to working out of town, checked at [facility] today and it was 8, unsure when she had it checked prior to today. Warfarin managed by Dr. Emely Alberto office. Patient denies any changes to medications or diet. Patient has not taken warfarin since Sunday 4/7/19. Confirmed allergies and updated preferred pharmacy to [facility]. Medications added:  
 
none Medications removed: 
 
Symbicort Ondansetron Ranitidine Medications adjusted: 
 
Warfarin 10mg daily adjusted from 12mg daily per patient Methadone 31mg daily-per patient, will confirm with clinic in the morning Information obtained from: patient, Rx query Allergies: Tetanus immune globulin Prior to Admission Medications:  
 
Medication Documentation Review Audit Reviewed by Kaleb Villa (Pharmacist) on 04/10/19 at 2002 Medication Sig Documenting Provider Last Dose Status Taking? albuterol (PROVENTIL HFA, VENTOLIN HFA, PROAIR HFA) 90 mcg/actuation inhaler Take 2 Puffs by inhalation every four (4) hours as needed for Wheezing or Shortness of Breath.  Elvis Madrid, ALISON  Active Yes  
albuterol sulfate (PROVENTIL;VENTOLIN) 2.5 mg/0.5 mL nebu nebulizer solution 0.5 mL by Nebulization route every four (4) hours as needed for Wheezing. Jose Cárdenas NP  Active Yes  
eszopiclone (LUNESTA) 3 mg tablet Take 3 mg by mouth nightly as needed for Sleep. Provider, Historical  Active Yes Med Note (ELIZABETH GIVENS   Wed Apr 10, 2019  7:56 PM) Takes very rarely  
lisinopril (PRINIVIL, ZESTRIL) 20 mg tablet TAKE 1 TABLET BY MOUTH EVERY DAY Jose Cárdenas NP 4/7/2019 Active Yes  
methadone (DOLOPHINE) 10 mg/mL solution Take 31 mg by mouth daily. Cassidy Garcia MD 4/10/2019 AM Active Yes Med Note (ELIZABETH GIVENS   Wed Apr 10, 2019  7:57 PM) Mason July from [facility]: [phone], patient states she takes for chronic pain and is being weaned down  
omeprazole (PRILOSEC) 40 mg capsule TAKE 1 CAPSULE BY MOUTH EVERY DAY Cassidy Garcia MD 4/7/2019 Active Yes  
warfarin (COUMADIN) 10 mg tablet TAKE 1 TABLET BY MOUTH EVERY DAY Jose Cárdenas NP 4/7/2019 PM Active Yes Thank you, Leonora Triplett, PharmD, BCPS   Contact: [phone]

## 2019-04-11 NOTE — CONSULTS
[facility] [place]  [address] [facility], [address] [facility] [address] [facility]  Segundo Claw: [phone]  F: [phone]      Reason for Visit:   Silvia Catherine is a 50 y.o. female who is seen in consultation at the request of Dr. Juwan Grider for evaluation of hx of DVT/PE. Hematology / Oncology Treatment History:   Hx of DVT/ PE    History of Present Illness:   Ms. Ilia Garcia is a 51 y/o female with h/o Takayasu arteritis and DVT on Warfarin admitted on 4/11/2019 with calf pain and supratherapeutic INR. She states she usually takes Warfarin 10mg daily, but has not taken for the past 2 days due to R leg pain. She developed severe R calf pain 2 days ago, went to see her PCP. INR check in PCP's office was 8, and patient was referred to the ER. INR here in ED is 12.3; LE doppler negative for DVT; given Vit K 5 mg x 1 in ED. Pain to rt calf rated 5/10. INR managed by Bishop Singer; has been normal with coumadin 10 mg daily. Pt reports difficulty getting to have INR checked monthly b/c she works out of town. Was told her insurance would not cover a home INR monitor. Was also told she couldn't go on any of the other blood thinners b/c of her arteritis. Ms. Dario Vigil reports first PE occurred in 1996, was treated with heparin followed by Warfarin for an indefinite time. Was told the PE was related to her arteritis. Went back to the ED within a few days of discharge and was told that there were new clots on the the same side but was kept on coumadin. She is unsure when second PE occurred but was treated with heparin, followed by lovenox followed by coumadin. Third VTE was in 2005 and was also a PE. This was treated with  heparin, lovenox  and then coumadin. Denies ever having been diagnosed with DVTs. Reports in the past has only missed coumadin for approx 2 weeks but then started on ASA.  She was diagnosed with Takayusu's disease in 1992; follows with Dr. Darnell Mcmullen at [facility]; suppose to follow every 6 months but only goes when she has problems. Due to follow in July. She is treated with Prednisone at time of arteritis flares. No recent antibiotics, infections, changes in diet or medications changes. Past Medical History:   Diagnosis Date    Bipolar 1 disorder ([address]     GERD (gastroesophageal reflux disease)     Hypertension     Neuropathy     Ovarian cyst     PE (pulmonary embolism)     Presbyesophagus     Reflux     Spinal artery aneurysm (HCC)     Takayasu's arteritis (HCC)       Past Surgical History:   Procedure Laterality Date    HX APPENDECTOMY      HX GYN      C-sections    HX GYN      ablation      Social History     Tobacco Use    Smoking status: Never Smoker    Smokeless tobacco: Never Used   Substance Use Topics    Alcohol use: No     Alcohol/week: 0.0 oz      Family History   Problem Relation Age of Onset    Heart Disease Mother     Stroke Father     Heart Disease Maternal Grandmother     Stroke Paternal Grandmother     Cancer Paternal Grandmother      Current Facility-Administered Medications   Medication Dose Route Frequency    lisinopril (PRINIVIL, ZESTRIL) tablet 20 mg  20 mg Oral DAILY    pantoprazole (PROTONIX) tablet 40 mg  40 mg Oral ACB    methadone (DOLOPHINE) 10 mg/mL concentrated solution 31 mg  31 mg Oral DAILY    sodium chloride (NS) flush 5-40 mL  5-40 mL IntraVENous Q8H    sodium chloride (NS) flush 5-40 mL  5-40 mL IntraVENous PRN     Current Outpatient Medications   Medication Sig    eszopiclone (LUNESTA) 3 mg tablet Take 3 mg by mouth nightly as needed for Sleep.     lisinopril (PRINIVIL, ZESTRIL) 20 mg tablet TAKE 1 TABLET BY MOUTH EVERY DAY    omeprazole (PRILOSEC) 40 mg capsule TAKE 1 CAPSULE BY MOUTH EVERY DAY    warfarin (COUMADIN) 10 mg tablet TAKE 1 TABLET BY MOUTH EVERY DAY    albuterol (PROVENTIL HFA, VENTOLIN HFA, PROAIR HFA) 90 mcg/actuation inhaler Take 2 Puffs by inhalation every four (4) hours as needed for Wheezing or Shortness of Breath.  albuterol sulfate (PROVENTIL;VENTOLIN) 2.5 mg/0.5 mL nebu nebulizer solution 0.5 mL by Nebulization route every four (4) hours as needed for Wheezing.  methadone (DOLOPHINE) 10 mg/mL solution Take 31 mg by mouth daily. Allergies   Allergen Reactions    Tetanus Immune Globulin Swelling        Review of Systems: A complete review of systems was obtained, negative except as described above. Physical Exam:     Visit Vitals  /49 (BP 1 Location: Right arm)   Pulse 64   Temp 98 °F (36.7 °C)   Resp 14   Ht 5' 4\" (1.626 m)   Wt 61.2 kg (135 lb)   SpO2 98%   BMI 23.17 kg/m²     ECOG PS: 1  General: No distress  Eyes: PERRLA, anicteric sclerae  HENT: Atraumatic with normal appearance of ears and nose; OP clear  Neck: Supple; no thyromegaly   Lymphatic: No cervical, supraclavicular, or axillary adenopathy  Respiratory: CTAB, normal respiratory effort  CV: Normal rate, regular rhythm, no murmurs, no peripheral edema  GI: Soft, nontender, nondistended, no masses, no hepatomegaly, no splenomegaly  MS:  Digits without clubbing or cyanosis. Skin: No rashes. Few faint scattered ecchymoses on right shin, larger purple ecchymoses on left upper thigh/hip. No petechiae. Normal temperature, turgor, and texture. Neuro/Psych: Alert, oriented, appropriate affect, normal judgment/insight      Results:     Lab Results   Component Value Date/Time    WBC 14.0 (H) 04/11/2019 02:00 AM    HGB 12.1 04/11/2019 02:00 AM    HCT 36.8 04/11/2019 02:00 AM    PLATELET 411 56/04/0408 02:00 AM    MCV 88.2 04/11/2019 02:00 AM    ABS.  NEUTROPHILS 9.0 (H) 04/11/2019 02:00 AM     Lab Results   Component Value Date/Time    Sodium 138 04/11/2019 02:00 AM    Potassium 3.5 04/11/2019 02:00 AM    Chloride 108 04/11/2019 02:00 AM    CO2 28 04/11/2019 02:00 AM    Glucose 100 04/11/2019 02:00 AM    BUN 7 04/11/2019 02:00 AM    Creatinine 0.64 04/11/2019 02:00 AM    GFR est AA >60 04/11/2019 02:00 AM    GFR est non-AA >60 04/11/2019 02:00 AM    Calcium 8.6 04/11/2019 02:00 AM     Lab Results   Component Value Date/Time    Bilirubin, total 0.4 04/11/2019 02:00 AM    ALT (SGPT) 16 04/11/2019 02:00 AM    AST (SGOT) 15 04/11/2019 02:00 AM    Alk. phosphatase 114 04/11/2019 02:00 AM    Protein, total 7.0 04/11/2019 02:00 AM    Albumin 3.3 (L) 04/11/2019 02:00 AM    Globulin 3.7 04/11/2019 02:00 AM     Lab Results   Component Value Date/Time    Sed rate, automated 21 (H) 04/11/2019 02:00 AM    TSH 1.370 05/03/2017 02:01 PM     Lab Results   Component Value Date/Time    INR 9.1 (HH) 04/11/2019 02:00 AM     No results found for: CEA, 107150, C199LT, C125, TFET603, 2729LT, C153LT, PSA, PSALT, LDH, [id number], HCGTLT, AFP, CHRGLT    4/10/2019 Duplex LE venous right  Right lower extremity venous duplex negative for deep venous thrombosis or thrombophlebitis. Left common femoral vein is thrombus free. 4/11/2019 Duplex LE Artery Right  pending    Assessment and Recommendations:      Kimmie Reaves is a 49 yo female with PMH of HTN, PE< Takayasu's arteritis and neuropathy admitted with rt calf pain. 1. Hx of Pulmonary emboli: Pt reports all VTE were attributed to her Takayasu's disease; Currently on Warfarin long term. Notes from PCP as well as based on patient reported history, patient has compliance issues with getting the INR checked regularly. -- Suggest possible check with insurance to see if home monitor is now available. 2. Supratherapeutic INR: INR 12.3 on admission. Received Vit K 5 mg in ED. Repeat INR this am 9.1. Doppler negative for DVT. Doppler artery study pending. -- As patient does not appear to be bleeding clinically or based on labs, I would allow the INR to drift down to therapeutic range naturally. Continue to monitor. 3. Takayasu's arteritis: Currently not on treatment: follows with Dr Megan Ruth on [facility]    4.  Chronic pain: Currently on Methadone    Patient seen in conjunction with Shahram Decker NP.    Signed By: Rossana Melendez MD

## 2019-04-11 NOTE — ADT AUTH CERT NOTES
Facility Name: [facility]           
[address] [facility], [address] [place] [address] NPI: [id number] 
  
  
  
  
   
Patient Demographics Patient Name Toño Troncoso Sex Female  
1970 Address [address] [facility] Phone [phone] (Home) [phone] (Mobile) *Preferred* Hospital Account Name Acct ID Class Status Primary Coverage Allegral Karyna [id number] INPATIENT Open [place]  
  
   
Guarantor Account (for Hospital Account [de-identified]) Name Relation to Pt Service Area Active? Acct Type Toño Troncoso Self [address] Yes Personal/Family Address Phone [address] [phone](W)    
  
   
Coverage Information (for Hospital Account [de-identified]) F/O Payor/Plan Subscriber  Subscriber Sex Precert # [place] BLUE [place] OUT OF STATE 70 F Subscriber Subscriber # Toño Troncoso [id number] [facility] # Group Name Judith Padron Address Phone [id number] Policy Number Status Effective Date Benefits Phone [id number] -  - Auth/Cert  
  
  
   
Diagnosis Codes Comments Supratherapeutic INR  ICD-10-CM: R79.1 ICD-9-CM: 790.92   
  
   
Admission Information Arrival Date/Time: 04/10/2019 1617 Admit Date/Time: 04/10/2019 1635 IP Adm. Date/Time: 04/10/2019 2112 Admission Type: Emergency Point of Origin: Non-health Care Facility/self Admit Category:   
Means of Arrival: Car Primary Service: Cardiac Cath Lab Secondary Service: N/A Transfer Source:  Service Area: [address] [place] [address] Unit: [facility]  MED SURG 2 Admit Provider: Mandie Singh MD Attending Provider: Jeanmarie Moralez MD Referring Provider:   
Admission Information Attending Provider Admission Dx Admitted On  
Mandie Singh MD Supratherapeutic INR 04/10/19 Service Isolation Code Status CARDIAC CATH LAB  Full Code  
Allergies Advance Care Planning Tetanus Immune Globulin Jump to the Activity    
Admission Information Unit/Bed: SF 5M2 MED SURG  Service: CARDIAC CATH LAB Admitting provider: Robel Perez MD Phone: [phone] Attending provider: Robel Perez MD Phone: [phone] PCP: Sam Calles NP Phone: [phone] Admission dx:  Patient class: I Admission type: ER    
Patient Demographics Patient Name Ronda Morris 
[id number] Sex Female  
1970 Address [address] [place] [address] Phone [phone] (Home) [phone] (Mobile) *Preferred* H&P Notes  
 
 H&P by Ramesh Wright MD at 04/10/19 2120 documented on ED to Hosp-Admission (Current) from 4/10/2019 in [facility] MED SURG 2 Author: Ramesh Wright MD Author Type: Resident Filed: 19 0116 Note Status: Cosign Needed Addendum Cosign: Cosign Required Date of Service: 04/10/19 2120 : Ramesh Wright MD (Resident) Prior Versions: 1. Ramesh Wright MD (Resident) at 19 - Cosign Needed Addendum 2. Ramesh Wright MD (Resident) at 19 - Cosign Needed Expand All Collapse All   
  
  
[address]. [address] PROGRAM  
Admission H&P 
  
Date of admission: 4/10/2019 
  
Patient name: Maikel Crocker MRN: [id number] YOB: 1970 Age: 50 y. o.  
  
Primary care provider:  Sam Calles NP  
  
Source of Information: patient, medical records 
  
Chief complaint:  \"my leg hurts and my INR is high\" 
  
History of Present Illness Maikel Crocker is a 50 y.o. female with Takayasu's arteritis (not on any treatment currently), hx of PE (, , ) (on coumadin), chronic pain (on methadone), HTN who presents to the ED from PCP's office due to supra therapeutic INR. Pt presented to PCP office on day of admission due to R. Calf pain that started 2 days ago. No trauma prior to pain. INR in PCP office was 8.  Pt currently on coumadin 10mg daily, last done was  4/7/19 per Pt. Stopped taking her coumadin after her R. Calf pain started. Per Pt: previous supra therapeutic INR several years ago was associated with calf pain which is why she stopped taking coumadin. Pt denies dyspnea, leg swelling, erythema/ warmth in RLE, HA, CP, N/V/D. She also denies hematuria, hematochezia/melena or hemoptysis. Endorses some intermittent bruising which is her baseline.  
  
In the ER, pertinent vital signs were: T98.4F, P: 83,  BP: 179/69, O2: 99% RA. Labs were remarkable for INR: 12.3 which was 12.3 when repeated. UDS: Positive for Opiates and methadone. (Pt did receive opioids prior to UDS) Bilateral LE venous dopplers: Neg for DVT 
  
  
Pt was treated with: Norco for pain control and PO vitamin K 5mg x1.  
  
Home Medications Prior to Admission medications Medication Sig Start Date End Date Taking? Authorizing Provider  
eszopiclone (LUNESTA) 3 mg tablet Take 3 mg by mouth nightly as needed for Sleep.     Yes Provider, Historical  
lisinopril (PRINIVIL, ZESTRIL) 20 mg tablet TAKE 1 TABLET BY MOUTH EVERY DAY 3/21/19   Yes Loraine Santacruz NP  
omeprazole (PRILOSEC) 40 mg capsule TAKE 1 CAPSULE BY MOUTH EVERY DAY 12/6/18   Yes Moises Garcia MD  
warfarin (COUMADIN) 10 mg tablet TAKE 1 TABLET BY MOUTH EVERY DAY 9/28/18   Yes Loraine Santacruz NP  
albuterol (PROVENTIL HFA, VENTOLIN HFA, PROAIR HFA) 90 mcg/actuation inhaler Take 2 Puffs by inhalation every four (4) hours as needed for Wheezing or Shortness of Breath. 5/30/18   Yes Loraine Santacruz NP  
albuterol sulfate (PROVENTIL;VENTOLIN) 2.5 mg/0.5 mL nebu nebulizer solution 0.5 mL by Nebulization route every four (4) hours as needed for Wheezing. 5/30/18   Yes Loraine Santacruz NP  
methadone (DOLOPHINE) 10 mg/mL solution Take 31 mg by mouth daily. 12/8/16   Yes Moises Garcia MD  
  
  
Allergies Allergies Allergen Reactions  Tetanus Immune Globulin Swelling  
  
  
    
Past Medical History:  
Diagnosis Date  Bipolar 1 disorder (HCC)    
 GERD (gastroesophageal reflux disease)    
 Hypertension    
 Neuropathy    
 Ovarian cyst    
 PE (pulmonary embolism)    
 Presbyesophagus    
 Reflux    
 Spinal artery aneurysm (HCC)    
 Takayasu's arteritis (HCC)    
  
  
     
Past Surgical History:  
Procedure Laterality Date  HX APPENDECTOMY      
 HX GYN      
  C-sections  HX GYN      
  ablation  
  
  
     
Family History Problem Relation Age of Onset  Heart Disease Mother    
 Stroke Father    
 Heart Disease Maternal Grandmother    
 Stroke Paternal Grandmother    
 Cancer Paternal Grandmother    
  
  
Social History Patient resides X  Independently   
   With family care   
   Assisted living   
   SNF   
  
Ambulates X  Independently   
   With cane    
   Assisted walker   
     
  
Alcohol history X  None  
   Social  
   Chronic  
  
Smoking history X  None  
   Former smoker  
   Current smoker  
  
Social History  
  
   
Tobacco Use Smoking Status Never Smoker Smokeless Tobacco Never Used  
  
  
Code status X  Full code  
   DNR/DNI  
   Partial   
Code status discussed with the patient/caregivers. 
  
Review of Systems Review of Systems Constitutional: Negative for chills, fever and malaise/fatigue. HENT: Negative for nosebleeds and sore throat. Eyes: Negative for blurred vision and double vision. Respiratory: Negative for cough, hemoptysis, sputum production, shortness of breath and wheezing. Cardiovascular: Negative for chest pain, palpitations and leg swelling. Gastrointestinal: Negative for abdominal pain, blood in stool, constipation, diarrhea, heartburn, melena, nausea and vomiting. Genitourinary: Negative for dysuria and hematuria. Musculoskeletal: Positive for myalgias.   
Neurological: Negative for dizziness, sensory change, focal weakness, seizures, loss of consciousness, weakness and headaches. Endo/Heme/Allergies: Bruises/bleeds easily.  
  
Physical Exam 
Visit Vitals /65 Pulse 65 Temp 98.4 °F (36.9 °C) Resp 12 Ht 5' 4\" (1.626 m) Wt 135 lb (61.2 kg) SpO2 99% BMI 23.17 kg/m²  
  
  
General: No acute distress. Alert. Cooperative. Head: Normocephalic. Atraumatic. Eyes:             Conjunctiva pink. Sclera white. PERRL. Ears:             Ear canals patent. TM non-erythematous. Nose:             Septum midline. Mucosa pink. No drainage. Throat: Mucosa pink. Moist mucous membranes. No tonsillar exudates or erythema. Palate movement equal bilaterally. Neck: Supple. Normal ROM. No stiffness. Respiratory: CTAB. No w/r/r/c.  
Cardiovascular: RRR. Bilateral carotid bruits present. 4/6 murmur present and loudest over L. Sternal border GI: + bowel sounds. Nontender. No rebound tenderness or guarding. Nondistended. Extremities: Tenderness to palpation in RLE (calf). No swelling, erythema or edema noted in bilateral LE. Dorsalis pedis and posterior tibial pulses 2+ bilaterally. Skin is soft to touch. Musculoskeletal: Full ROM in all extremities. Neuro: CN II-XII grossly intact. Strength 5/5 in all extremities. Sensation intact in all extremities. DTRs 2+ throughout. Skin: Clear. No rashes. No ulcers. : Deferred Rectal: Deferred  
  
  
Laboratory Data Recent Results Recent Results (from the past 24 hour(s)) AMB POC PT/INR  
  Collection Time: 04/10/19  2:18 PM  
Result Value Ref Range  
  VALID INTERNAL CONTROL POC Yes    
  Prothrombin time (POC)   seconds  
  INR POC 8.0    
CBC WITH AUTOMATED DIFF  
  Collection Time: 04/10/19  4:41 PM  
Result Value Ref Range  
  WBC 10.0 3.6 - 11.0 K/uL  
  RBC 4.61 3.80 - 5.20 M/uL  
  HGB 13.2 11.5 - 16.0 g/dL  
  HCT 40.1 35.0 - 47.0 %  
  MCV 87.0 80.0 - 99.0 FL  
  MCH 28.6 26.0 - 34.0 PG  
  MCHC 32.9 30.0 - 36.5 g/dL  
  RDW 13.2 11.5 - 14.5 %  
  PLATELET 660 576 - 400 K/uL  
  MPV 9.5 8.9 - 12.9 FL  
  NRBC 0.0 0  WBC  
  ABSOLUTE NRBC 0.00 0.00 - 0.01 K/uL  
  NEUTROPHILS 75 32 - 75 %  
  LYMPHOCYTES 17 12 - 49 %  
  MONOCYTES 6 5 - 13 %  
  EOSINOPHILS 1 0 - 7 %  
  BASOPHILS 1 0 - 1 %  
  IMMATURE GRANULOCYTES 0 0.0 - 0.5 %  
  ABS. NEUTROPHILS 7.5 1.8 - 8.0 K/UL  
  ABS. LYMPHOCYTES 1.7 0.8 - 3.5 K/UL  
  ABS. MONOCYTES 0.6 0.0 - 1.0 K/UL  
  ABS. EOSINOPHILS 0.1 0.0 - 0.4 K/UL  
  ABS. BASOPHILS 0.1 0.0 - 0.1 K/UL  
  ABS. IMM. GRANS. 0.0 0.00 - 0.04 K/UL  
  DF AUTOMATED METABOLIC PANEL, BASIC  
  Collection Time: 04/10/19  4:41 PM  
Result Value Ref Range  
  Sodium 138 136 - 145 mmol/L  
  Potassium 3.6 3.5 - 5.1 mmol/L  
  Chloride 106 97 - 108 mmol/L  
  CO2 25 21 - 32 mmol/L  
  Anion gap 7 5 - 15 mmol/L  
  Glucose 151 (H) 65 - 100 mg/dL  
  BUN 6 6 - 20 MG/DL  
  Creatinine 0.77 0.55 - 1.02 MG/DL  
  BUN/Creatinine ratio 8 (L) 12 - 20    
  GFR est AA >60 >60 ml/min/1.73m2  
  GFR est non-AA >60 >60 ml/min/1.73m2  
  Calcium 9.2 8.5 - 10.1 MG/DL  
SAMPLES BEING HELD  
  Collection Time: 04/10/19  4:41 PM  
Result Value Ref Range  
  SAMPLES BEING HELD SST. RED    
  COMMENT      
    Add-on orders for these samples will be processed based on acceptable specimen integrity and analyte stability, which may vary by analyte. PROTHROMBIN TIME + INR  
  Collection Time: 04/10/19  4:42 PM  
Result Value Ref Range  
  INR >12.3 (HH) 0.9 - 1.1  
  Prothrombin time 115.0 (H) 9.0 - 11.1 sec URINALYSIS W/MICROSCOPIC  
  Collection Time: 04/10/19  7:01 PM  
Result Value Ref Range  
  Color YELLOW/STRAW    
  Appearance CLEAR CLEAR    
  Specific gravity 1.010 1.003 - 1.030    
  pH (UA) 6.0 5.0 - 8.0    
  Protein NEGATIVE  NEG mg/dL  
  Glucose NEGATIVE  NEG mg/dL  
  Ketone NEGATIVE  NEG mg/dL  
  Bilirubin NEGATIVE  NEG    
  Blood SMALL (A) NEG    
  Urobilinogen 1.0 0.2 - 1.0 EU/dL  
  Nitrites NEGATIVE  NEG    
  Leukocyte Esterase SMALL (A) NEG    
  WBC 5-10 0 - 4 /hpf  
  RBC 0-5 0 - 5 /hpf  
  Epithelial cells FEW FEW /lpf  
  Bacteria NEGATIVE  NEG /hpf  
  Hyaline cast 0-2 0 - 5 /lpf URINE CULTURE HOLD SAMPLE  
  Collection Time: 04/10/19  7:01 PM  
Result Value Ref Range  
  Urine culture hold      
    URINE ON HOLD IN MICROBIOLOGY DEPT FOR 3 DAYS. IF UNPRESERVED URINE IS SUBMITTED, IT CANNOT BE USED FOR ADDITIONAL TESTING AFTER 24 HRS, RECOLLECTION WILL BE REQUIRED. DRUG SCREEN, URINE  
  Collection Time: 04/10/19  7:01 PM  
Result Value Ref Range  
  AMPHETAMINES NEGATIVE  NEG    
  BARBITURATES NEGATIVE  NEG    
  BENZODIAZEPINES NEGATIVE  NEG    
  COCAINE NEGATIVE  NEG    
  METHADONE POSITIVE (A) NEG    
  OPIATES POSITIVE (A) NEG    
  PCP(PHENCYCLIDINE) NEGATIVE  NEG    
  THC (TH-CANNABINOL) NEGATIVE  NEG    
  Drug screen comment (NOTE)    
PROTHROMBIN TIME + INR  
  Collection Time: 04/10/19  7:12 PM  
Result Value Ref Range  
  INR >12.3 (HH) 0.9 - 1.1  
  Prothrombin time >120.0 (H) 9.0 - 11.1 sec  
  
  
  
Imaging - Bilateral LE venous dopplers: Neg for DVT.  
  
   
Assessment and Plan  
Keven Hughes is a 50 y.o. female with Takayasu's arteritis (not on any treatment currently), hx of PE (1996, 2003, 2009) (on coumadin), chronic pain (on methadone), HTN who is admitted for supratherapeutic INR.  
  
1. Supratherapeutic INR/ R. Calf pain: POA. INR 12.3 on admission. Unclear etiology, Possibly 2/2 takayasu arteritis. R. Calf pain possibly 2/2 arterial pain from vasculitis. Pt on lifelong AC due to hx of PE. Pt has no signs of active bleeding at all. Considering that INR >10 without any bleeding/stable Hgb, Pt received appropriate treatment in the ED with PO vitamin K 5mg. Bilateral LE venous doppler neg for DVT. Compartment syndrome unlikely given good peripheral pulses on exam, given NO focal neurological deficits and NO R. calf tightness on exam.  
- Admit to tele - f/u AM INR, ESR 
- f/u LE arterial dopplers, can consider CTA RLE if R. Calf pain is still out of proportion in the AM.  - Will consider Heme ONC consult - Watch for any signs of bleeding given high INR.  
  
2. HTN:   Continue home lisinopril 20mg daily. 
  
3. Chronic pain: Hold home methadone 31mg daily until pharmacy able to confirm methadone dose in AM.    
  
  
FEN/GI - low salt diet Activity - as tolerated DVT prophylaxis - supra therapeutic INR 
GI prophylaxis - Protonix Disposition - TBD 
  
CODE STATUS:  FULL CODE 
   
  
Patient discussed with Dr. Aniket Rodriguez, attending physician on call.  
  
Matthew Gallagher MD 
Family Medicine Resident 
[facility] Problems  Date Reviewed: 10/3/2018  
        Codes Class Noted POA  
  Supratherapeutic INR ICD-10-CM: R79.1 ICD-9-CM: 790.92   4/10/2019 Unknown  
     
   
   
  
Patient Demographics Patient Name Alla Hall 
[id number] Sex Female  
1970 Address [address] [place] [address] Phone [phone] (Home) [phone] (Mobile) *Preferred*  
CSN:  
[id number] Admit Date: Admit Time Room Bed Apr 10, 2019  4:35  [97536] 01 [[id number]] Attending Providers Provider Pager From To Nettie Parker MD  04/10/19 04/10/19 Corina Burk MD  04/10/19 04/10/19 Veena Verdugo MD  04/10/19 04/11/19 Veena Verdugo MD  19 Emergency Contact(s) Name Relation Home Work Mobile Josafat Dixon Spouse [phone] Utilization Reviews  
 
   
Hematology [address] Day 2 (2019) by Charlotte Siu RN  
 
   
Review Entered Review Status 2019 16:22 Completed  
   
Criteria Review Care Day: 2 Care Date: 2019 Level of Care: Telemetry Guideline Day 2 Level Of Care (X) Floor 2019 16:22:45 EDT by Charlotte Siu   
tele floor Clinical Status  
(X) * No ICU or intermediate care needs 2019 16:22:45 EDT by Charlotte Siu   
tele floor Interventions (X) Inpatient interventions continue 4/11/2019 16:22:45 EDT by Cooper Schaffer   
dilaudid 0.5 mg iv x 1 and 1 mg iv x 1   
  
(X) Transition to oral routes 4/11/2019 16:22:45 EDT by Cooper Schaffer   
tylenol 650 mg po q 6 hrs prn x 1, lisinopril 20 mg po q d, methadone 31 mg po q d, protonix 40 mg po q d, flexeril 10 mg po x 1,   
  
  
  
  
  
* Milestone Additional Notes 4-11-19 MD progress notes pending at this time. Vitals- 97.9, 62, 16, 116/63, 96%. Abnl labs- wbc 14, inr 9.1, 4.7, pt 81.8, 44, anion gap 2, albumin 3.3 Duplex rt lower ext- No narrowing of the arteries in the right lower extremity. Normal lower right arterial study Plan- reg diet, hematology consult, ambulate with assistance, scd, ice to [facility]  
  
   
Hematology [address] [place] [address] - Care Day 1 (4/10/2019) by Cooper Schaffer RN  
 
   
Review Entered Review Status 4/11/2019 09:05 Completed  
   
Criteria Review Care Day: 1 Care Date: 4/10/2019 Level of Care: Telemetry Guideline Day 1 Clinical Status  
(X) * Clinical Indications met[G] Interventions (X) Inpatient interventions as needed 4/11/2019 09:05:13 EDT by Cooper Schaffer   
cardiac monitor, scd, I/O   
  
  
  
  
  
* Milestone Additional Notes 4-10-19 Bonnie Charles is a 50 y.o. female with Takayasu's arteritis (not on any treatment currently), hx of PE (1996, 2003, 2009) (on coumadin), chronic pain (on methadone), HTN who is admitted for supratherapeutic INR.   
   
1. Supratherapeutic INR/ R. Calf pain: POA. INR 12.3 on admission. Unclear etiology, Possibly 2/2 takayasu arteritis. R. Calf pain possibly 2/2 arterial pain from vasculitis. Pt on lifelong AC due to hx of PE. Pt has no signs of active bleeding at all. Considering that INR >10 without any bleeding/stable Hgb, Pt received appropriate treatment in the ED with PO vitamin K 5mg. Bilateral LE venous doppler neg for DVT.  Compartment syndrome unlikely given good peripheral pulses on exam, given NO focal neurological deficits and NO R. calf tightness on exam.   
- Admit to tele - f/u AM INR, ESR  
- f/u LE arterial dopplers, can consider CTA RLE if R. Calf pain is still out of proportion in the AM.  - Will consider Heme ONC consult - Watch for any signs of bleeding given high INR.   
   
2. HTN:   Continue home lisinopril 20mg daily.  
   
3. Chronic pain: Hold home methadone 31mg daily until pharmacy able to confirm methadone dose in AM.     
Vitals- 98.4, 83, 24, 179/69, 99%. Meds- norco po x 2, vit k 5 mg po x 1  
  
   
Hematology GRG - Clinical Indications for Admission to Inpatient Care by Lanny Whitley RN  
 
   
Review Entered Review Status 4/11/2019 09:02 Completed  
   
Criteria Review Clinical Indications for Admission to Inpatient Care Most Recent : Lanny Whitley Most Recent Date: 4/11/2019 09:02:10 EDT  
(X) Hospital admission is needed for appropriate care of the patient because of1 or more of the   
following:  
   (X) Severe over-anticoagulation or high-risk situation as indicated by1 or more of the following(37):  
      (X) INR 9 or greater  
      4/11/2019 09:02:10 EDT by Lanny Whitley Abnl labs- inr >12.3, pt 115, >120, Additional Notes 4-10-19  
50 y.o. female with Takayasu's arteritis (not on any treatment currently), hx of PE (1996, 2003, 2009) (on coumadin), chronic pain (on methadone), HTN who presents to the ED from PCP's office due to supra therapeutic INR. Pt presented to PCP office on day of admission due to R. Calf pain that started 2 days ago. No trauma prior to pain. INR in PCP office was 8. Pt currently on coumadin 10mg daily, last done was  4/7/19 per Pt. Stopped taking her coumadin after her R. Calf pain started.  Per Pt: previous supra therapeutic INR several years ago was associated with calf pain which is why she stopped taking coumadin.     Pt denies dyspnea, leg swelling, erythema/ warmth in RLE, HA, CP, N/V/D. She also denies hematuria, hematochezia/melena or hemoptysis. Endorses some intermittent bruising which is her baseline In the ER, pertinent vital signs were: T98.4F, P: 83,  BP: 179/69, O2: 99% RA. Labs were remarkable for INR: 12.3 which was 12.3 when repeated. UDS: Positive for Opiates and methadone. (Pt did receive opioids prior to UDS) Bilateral LE venous dopplers: Neg for DVT Pt was treated with: Norco for pain control and PO vitamin K 5mg x1 Vitals- 98.4, 83, 24, 179/69, 99%. Urine- blood small, leuk est small, wbc 5-10 Abnl labs- inr >12.3, pt 115, >120, glucose 151 UDS- methadone +, opiates +

## 2019-04-11 NOTE — H&P
[id number] [facility] PROGRAM  
Admission H&P Date of admission: 4/10/2019 Patient name: Cindy Francois MRN: [id number] YOB: 1970 Age: 50 y.o. Primary care provider:  Sim Grimaldo NP Source of Information: patient, medical records Chief complaint:  \"my leg hurts and my INR is high\" History of Present Illness Cindy Francois is a 50 y.o. female with Takayasu's arteritis (not on any treatment currently), hx of PE (1996, 2003, 2009) (on coumadin), chronic pain (on methadone), HTN who presents to the ED from PCP's office due to supra therapeutic INR. Pt presented to PCP office on day of admission due to R. Calf pain that started 2 days ago. No trauma prior to pain. INR in PCP office was 8. Pt currently on coumadin 10mg daily, last done was  4/7/19 per Pt. Stopped taking her coumadin after her R. Calf pain started. Per Pt: previous supra therapeutic INR several years ago was associated with calf pain which is why she stopped taking coumadin. Pt denies dyspnea, leg swelling, erythema/ warmth in RLE, HA, CP, N/V/D. She also denies hematuria, hematochezia/melena or hemoptysis. Endorses some intermittent bruising which is her baseline. In the ER, pertinent vital signs were: T98.4F, P: 83,  BP: 179/69, O2: 99% RA. Labs were remarkable for INR: 12.3 which was 12.3 when repeated. UDS: Positive for Opiates and methadone. (Pt did receive opioids prior to UDS) Bilateral LE venous dopplers: Neg for DVT Pt was treated with: Norco for pain control and PO vitamin K 5mg x1. Home Medications Prior to Admission medications Medication Sig Start Date End Date Taking? Authorizing Provider  
eszopiclone (LUNESTA) 3 mg tablet Take 3 mg by mouth nightly as needed for Sleep.    Yes Provider, Historical  
lisinopril (PRINIVIL, ZESTRIL) 20 mg tablet TAKE 1 TABLET BY MOUTH EVERY DAY 3/21/19  Yes Sim Grimaldo NP  
omeprazole (PRILOSEC) 40 mg capsule TAKE 1 CAPSULE BY MOUTH EVERY DAY 12/6/18  Yes Joya Garcia MD  
warfarin (COUMADIN) 10 mg tablet TAKE 1 TABLET BY MOUTH EVERY DAY 9/28/18  Yes Velia Figueroa NP  
albuterol (PROVENTIL HFA, VENTOLIN HFA, PROAIR HFA) 90 mcg/actuation inhaler Take 2 Puffs by inhalation every four (4) hours as needed for Wheezing or Shortness of Breath. 5/30/18  Yes Velia Figueroa NP  
albuterol sulfate (PROVENTIL;VENTOLIN) 2.5 mg/0.5 mL nebu nebulizer solution 0.5 mL by Nebulization route every four (4) hours as needed for Wheezing. 5/30/18  Yes Velia Figueroa NP  
methadone (DOLOPHINE) 10 mg/mL solution Take 31 mg by mouth daily. 12/8/16  Yes Joya Garcia MD  
 
 
Allergies Allergies Allergen Reactions  Tetanus Immune Globulin Swelling Past Medical History:  
Diagnosis Date  Bipolar 1 disorder ([address]  GERD (gastroesophageal reflux disease)  Hypertension  Neuropathy  Ovarian cyst   
 PE (pulmonary embolism)  Presbyesophagus  Reflux  Spinal artery aneurysm ([address]  Takayasu's arteritis ([address] Past Surgical History:  
Procedure Laterality Date  HX APPENDECTOMY  HX GYN    
 C-sections  HX GYN    
 ablation Family History Problem Relation Age of Onset  Heart Disease Mother  Stroke Father  Heart Disease Maternal Grandmother  Stroke Paternal Grandmother  Cancer Paternal Grandmother Social History Patient resides X  Independently With family care Assisted living SNF Ambulates X  Independently With cane Assisted walker Alcohol history X  None Social  
  Chronic Smoking history X  None Former smoker Current smoker Social History Tobacco Use Smoking Status Never Smoker Smokeless Tobacco Never Used Code status X  Full code DNR/DNI Partial   
Code status discussed with the patient/caregivers.  
 
Review of Systems Review of Systems Constitutional: Negative for chills, fever and malaise/fatigue. HENT: Negative for nosebleeds and sore throat. Eyes: Negative for blurred vision and double vision. Respiratory: Negative for cough, hemoptysis, sputum production, shortness of breath and wheezing. Cardiovascular: Negative for chest pain, palpitations and leg swelling. Gastrointestinal: Negative for abdominal pain, blood in stool, constipation, diarrhea, heartburn, melena, nausea and vomiting. Genitourinary: Negative for dysuria and hematuria. Musculoskeletal: Positive for myalgias. Neurological: Negative for dizziness, sensory change, focal weakness, seizures, loss of consciousness, weakness and headaches. Endo/Heme/Allergies: Bruises/bleeds easily. Physical Exam 
Visit Vitals /65 Pulse 65 Temp 98.4 °F (36.9 °C) Resp 12 Ht 5' 4\" (1.626 m) Wt 135 lb (61.2 kg) SpO2 99% BMI 23.17 kg/m² General: No acute distress. Alert. Cooperative. Head: Normocephalic. Atraumatic. Eyes:  Conjunctiva pink. Sclera white. PERRL. Ears:  Ear canals patent. TM non-erythematous. Nose:  Septum midline. Mucosa pink. No drainage. Throat: Mucosa pink. Moist mucous membranes. No tonsillar exudates or erythema. Palate movement equal bilaterally. Neck: Supple. Normal ROM. No stiffness. Respiratory: CTAB. No w/r/r/c.  
Cardiovascular: RRR. Bilateral carotid bruits present. 4/6 murmur present and loudest over L. Sternal border GI: + bowel sounds. Nontender. No rebound tenderness or guarding. Nondistended. Extremities: Tenderness to palpation in RLE (calf). No swelling, erythema or edema noted in bilateral LE. Dorsalis pedis and posterior tibial pulses 2+ bilaterally. Skin is soft to touch. Musculoskeletal: Full ROM in all extremities. Neuro: CN II-XII grossly intact. Strength 5/5 in all extremities. Sensation intact in all extremities. DTRs 2+ throughout. Skin: Clear. No rashes. No ulcers. : Deferred Rectal: Deferred Laboratory Data Recent Results (from the past 24 hour(s)) AMB POC PT/INR Collection Time: 04/10/19  2:18 PM  
Result Value Ref Range VALID INTERNAL CONTROL POC Yes Prothrombin time (POC)  seconds INR POC 8.0   
CBC WITH AUTOMATED DIFF Collection Time: 04/10/19  4:41 PM  
Result Value Ref Range WBC 10.0 3.6 - 11.0 K/uL  
 RBC 4.61 3.80 - 5.20 M/uL  
 HGB 13.2 11.5 - 16.0 g/dL HCT 40.1 35.0 - 47.0 % MCV 87.0 80.0 - 99.0 FL  
 MCH 28.6 26.0 - 34.0 PG  
 MCHC 32.9 30.0 - 36.5 g/dL  
 RDW 13.2 11.5 - 14.5 % PLATELET 917 265 - 832 K/uL MPV 9.5 8.9 - 12.9 FL  
 NRBC 0.0 0  WBC ABSOLUTE NRBC 0.00 0.00 - 0.01 K/uL NEUTROPHILS 75 32 - 75 % LYMPHOCYTES 17 12 - 49 % MONOCYTES 6 5 - 13 % EOSINOPHILS 1 0 - 7 % BASOPHILS 1 0 - 1 % IMMATURE GRANULOCYTES 0 0.0 - 0.5 % ABS. NEUTROPHILS 7.5 1.8 - 8.0 K/UL  
 ABS. LYMPHOCYTES 1.7 0.8 - 3.5 K/UL  
 ABS. MONOCYTES 0.6 0.0 - 1.0 K/UL  
 ABS. EOSINOPHILS 0.1 0.0 - 0.4 K/UL  
 ABS. BASOPHILS 0.1 0.0 - 0.1 K/UL  
 ABS. IMM. GRANS. 0.0 0.00 - 0.04 K/UL  
 DF AUTOMATED METABOLIC PANEL, BASIC Collection Time: 04/10/19  4:41 PM  
Result Value Ref Range Sodium 138 136 - 145 mmol/L Potassium 3.6 3.5 - 5.1 mmol/L Chloride 106 97 - 108 mmol/L  
 CO2 25 21 - 32 mmol/L Anion gap 7 5 - 15 mmol/L Glucose 151 (H) 65 - 100 mg/dL BUN 6 6 - 20 MG/DL Creatinine 0.77 0.55 - 1.02 MG/DL  
 BUN/Creatinine ratio 8 (L) 12 - 20 GFR est AA >60 >60 ml/min/1.73m2 GFR est non-AA >60 >60 ml/min/1.73m2 Calcium 9.2 8.5 - 10.1 MG/DL  
SAMPLES BEING HELD Collection Time: 04/10/19  4:41 PM  
Result Value Ref Range SAMPLES BEING HELD SST. RED   
 COMMENT Add-on orders for these samples will be processed based on acceptable specimen integrity and analyte stability, which may vary by analyte. PROTHROMBIN TIME + INR  Collection Time: 04/10/19  4:42 PM  
Result Value Ref Range INR >12.3 (HH) 0.9 - 1.1 Prothrombin time 115.0 (H) 9.0 - 11.1 sec URINALYSIS W/MICROSCOPIC Collection Time: 04/10/19  7:01 PM  
Result Value Ref Range Color YELLOW/STRAW Appearance CLEAR CLEAR Specific gravity 1.010 1.003 - 1.030    
 pH (UA) 6.0 5.0 - 8.0 Protein NEGATIVE  NEG mg/dL Glucose NEGATIVE  NEG mg/dL Ketone NEGATIVE  NEG mg/dL Bilirubin NEGATIVE  NEG Blood SMALL (A) NEG Urobilinogen 1.0 0.2 - 1.0 EU/dL Nitrites NEGATIVE  NEG Leukocyte Esterase SMALL (A) NEG    
 WBC 5-10 0 - 4 /hpf  
 RBC 0-5 0 - 5 /hpf Epithelial cells FEW FEW /lpf Bacteria NEGATIVE  NEG /hpf Hyaline cast 0-2 0 - 5 /lpf URINE CULTURE HOLD SAMPLE Collection Time: 04/10/19  7:01 PM  
Result Value Ref Range Urine culture hold URINE ON HOLD IN MICROBIOLOGY DEPT FOR 3 DAYS. IF UNPRESERVED URINE IS SUBMITTED, IT CANNOT BE USED FOR ADDITIONAL TESTING AFTER 24 HRS, RECOLLECTION WILL BE REQUIRED. DRUG SCREEN, URINE Collection Time: 04/10/19  7:01 PM  
Result Value Ref Range AMPHETAMINES NEGATIVE  NEG    
 BARBITURATES NEGATIVE  NEG BENZODIAZEPINES NEGATIVE  NEG    
 COCAINE NEGATIVE  NEG METHADONE POSITIVE (A) NEG    
 OPIATES POSITIVE (A) NEG    
 PCP(PHENCYCLIDINE) NEGATIVE  NEG    
 THC (TH-CANNABINOL) NEGATIVE  NEG Drug screen comment (NOTE) PROTHROMBIN TIME + INR Collection Time: 04/10/19  7:12 PM  
Result Value Ref Range INR >12.3 (HH) 0.9 - 1.1 Prothrombin time >120.0 (H) 9.0 - 11.1 sec Imaging - Bilateral LE venous dopplers: Neg for DVT. Assessment and Plan  
Katharine Thayer is a 50 y.o. female with Takayasu's arteritis (not on any treatment currently), hx of PE (1996, 2003, 2009) (on coumadin), chronic pain (on methadone), HTN who is admitted for supratherapeutic INR. 1. Supratherapeutic INR/ R. Calf pain: POA. INR 12.3 on admission. Unclear etiology, Possibly 2/2 takayasu arteritis.  R. Calf pain possibly 2/2 arterial pain from vasculitis. Pt on lifelong AC due to hx of PE. Pt has no signs of active bleeding at all. Considering that INR >10 without any bleeding/stable Hgb, Pt received appropriate treatment in the ED with PO vitamin K 5mg. Bilateral LE venous doppler neg for DVT. Compartment syndrome unlikely given good peripheral pulses on exam, given NO focal neurological deficits and NO R. calf tightness on exam.  
- Admit to tele - f/u AM INR, ESR 
- f/u LE arterial dopplers, can consider CTA RLE if R. Calf pain is still out of proportion in the AM.  - Will consider Heme ONC consult - Watch for any signs of bleeding given high INR. 2. HTN:   Continue home lisinopril 20mg daily. 3. Chronic pain: Hold home methadone 31mg daily until pharmacy able to confirm methadone dose in AM. FEN/GI - low salt diet Activity - as tolerated DVT prophylaxis - supra therapeutic INR 
GI prophylaxis - Protonix Disposition - TBD 
 
CODE STATUS:  FULL CODE Patient discussed with Dr. Shakeel Camp, attending physician on call. Jevon Rodriguez MD 
Family Medicine Resident Hospital Problems Hospital Problems  Date Reviewed: 10/3/2018 Codes Class Noted POA Supratherapeutic INR ICD-10-CM: R79.1 ICD-9-CM: 790.92  4/10/2019 Unknown

## 2019-04-11 NOTE — PROGRESS NOTES
6674: Patient with complaint of 7/10 pain to right calf. Requesting something for pain. Notified family practice. Orders to follow. 1345: Patient INR 4.7. Notified family practice. No new orders. 1450; Patient reports 6/10 pain to calf with movement. Requesting tylenol. Notified family practice. Orders to follow. 1515: No orders for tylenol. Called and spoke to Dr. Ry Santiago. Orders to follow. TRANSFER - OUT REPORT: 
 
Verbal report given to Becca HENDERSON(name) on Abbey Hart  being transferred to 5th floor(unit) for routine progression of care Report consisted of patients Situation, Background, Assessment and  
Recommendations(SBAR). Information from the following report(s) SBAR, Kardex, Intake/Output, MAR, Recent Results and Cardiac Rhythm NSR was reviewed with the receiving nurse. Lines:  
Peripheral IV 04/10/19 Left Antecubital (Active) Site Assessment Clean, dry, & intact 4/11/2019 11:14 AM  
Phlebitis Assessment 0 4/11/2019 11:14 AM  
Infiltration Assessment 0 4/11/2019 11:14 AM  
Dressing Status Clean, dry, & intact 4/11/2019 11:14 AM  
Dressing Type Transparent 4/11/2019 11:14 AM  
Hub Color/Line Status Pink;Flushed;End cap changed 4/11/2019 12:51 PM  
Action Taken Blood drawn 4/10/2019  4:41 PM  
Alcohol Cap Used Yes 4/11/2019 11:14 AM  
  
 
Opportunity for questions and clarification was provided. Patient transported with: 
 Monitor Registered Nurse Tech

## 2019-04-11 NOTE — PROGRESS NOTES
4/11/2019 
9:39 AM 
Case Management Note Reason for Admission:   Right calf pain, increased INR Patient was sent by PCP for leg pain, patient has history of PE. She had not taken her blood thinner for a couple of days. Patient has history of Takayasu arteritis and chronic pain. Patient is , lives with  and is independent with ADL's. Carlos @ [facility]. RRAT Score:        1 Plan for utilizing home health:      Not for this admission Current Advanced Directive/Advance Care Plan:  Does not have Likelihood of Readmission:   Moderate, due chronic pain issues and hx of PE Transition of Care Plan: 1.   Care plan for INR management 2. Continue pain management follow up 3. PCP follow up. Care Management Interventions PCP Verified by CM: Edgar Goodpasture NP nn notified) Mode of Transport at Discharge: Self Transition of Care Consult (CM Consult): Discharge Planning Current Support Network: Lives with Spouse Confirm Follow Up Transport: Family Plan discussed with Pt/Family/Caregiver: Yes Discharge Location Discharge Placement: Home with family assistance Chandni Pereira, Estrella N Tor Rudd

## 2019-04-11 NOTE — PROGRESS NOTES
[facility]: MED RECONCILIATION Comments/Recommendations:  
Pharmacist confirmed methadone dose with Missael Bar as 31 mg per [facility] ([phone]). Treatment center states patient is currently tapering her methadone. Patient was on 33 mg until 3/22/19 where she was weaned to 32 mg and then on 4/5/19 patient was weaned to 31 mg. Treatment center states patient notifies them based on how she feels and when she is ready to taper/go down on dose vs having a formal/set taper. Patient signed a release of information for [facility] to obtain methadone dosing. Chaitanya Suazo, PHARMD   Contact: [phone]

## 2019-04-12 VITALS
WEIGHT: 135 LBS | DIASTOLIC BLOOD PRESSURE: 70 MMHG | SYSTOLIC BLOOD PRESSURE: 137 MMHG | HEART RATE: 68 BPM | BODY MASS INDEX: 23.05 KG/M2 | OXYGEN SATURATION: 98 % | RESPIRATION RATE: 18 BRPM | TEMPERATURE: 97.6 F | HEIGHT: 64 IN

## 2019-04-12 LAB
ALBUMIN SERPL-MCNC: 3.2 G/DL (ref 3.5–5)
ALBUMIN/GLOB SERPL: 0.9 {RATIO} (ref 1.1–2.2)
ALP SERPL-CCNC: 103 U/L (ref 45–117)
ALT SERPL-CCNC: 16 U/L (ref 12–78)
ANION GAP SERPL CALC-SCNC: 3 MMOL/L (ref 5–15)
AST SERPL-CCNC: 16 U/L (ref 15–37)
BASOPHILS # BLD: 0.1 K/UL (ref 0–0.1)
BASOPHILS NFR BLD: 1 % (ref 0–1)
BILIRUB SERPL-MCNC: 0.3 MG/DL (ref 0.2–1)
BUN SERPL-MCNC: 11 MG/DL (ref 6–20)
BUN/CREAT SERPL: 16 (ref 12–20)
CALCIUM SERPL-MCNC: 8.6 MG/DL (ref 8.5–10.1)
CHLORIDE SERPL-SCNC: 106 MMOL/L (ref 97–108)
CO2 SERPL-SCNC: 29 MMOL/L (ref 21–32)
CREAT SERPL-MCNC: 0.7 MG/DL (ref 0.55–1.02)
DIFFERENTIAL METHOD BLD: ABNORMAL
EOSINOPHIL # BLD: 0.5 K/UL (ref 0–0.4)
EOSINOPHIL NFR BLD: 5 % (ref 0–7)
ERYTHROCYTE [DISTWIDTH] IN BLOOD BY AUTOMATED COUNT: 13.3 % (ref 11.5–14.5)
GLOBULIN SER CALC-MCNC: 3.4 G/DL (ref 2–4)
GLUCOSE SERPL-MCNC: 106 MG/DL (ref 65–100)
HCT VFR BLD AUTO: 36.2 % (ref 35–47)
HGB BLD-MCNC: 11.9 G/DL (ref 11.5–16)
IMM GRANULOCYTES # BLD AUTO: 0 K/UL (ref 0–0.04)
IMM GRANULOCYTES NFR BLD AUTO: 0 % (ref 0–0.5)
INR PPP: 2.1 (ref 0.9–1.1)
INR PPP: 2.8 (ref 0.9–1.1)
LEFT ABI: 1.3
LEFT ANTERIOR TIBIAL: 180 MMHG
LEFT POSTERIOR TIBIAL: 190 MMHG
LYMPHOCYTES # BLD: 4.1 K/UL (ref 0.8–3.5)
LYMPHOCYTES NFR BLD: 40 % (ref 12–49)
MCH RBC QN AUTO: 29.5 PG (ref 26–34)
MCHC RBC AUTO-ENTMCNC: 32.9 G/DL (ref 30–36.5)
MCV RBC AUTO: 89.6 FL (ref 80–99)
MONOCYTES # BLD: 0.9 K/UL (ref 0–1)
MONOCYTES NFR BLD: 9 % (ref 5–13)
NEUTS SEG # BLD: 4.7 K/UL (ref 1.8–8)
NEUTS SEG NFR BLD: 45 % (ref 32–75)
NRBC # BLD: 0 K/UL (ref 0–0.01)
NRBC BLD-RTO: 0 PER 100 WBC
PLATELET # BLD AUTO: 298 K/UL (ref 150–400)
PMV BLD AUTO: 9.4 FL (ref 8.9–12.9)
POTASSIUM SERPL-SCNC: 3.7 MMOL/L (ref 3.5–5.1)
PROT SERPL-MCNC: 6.6 G/DL (ref 6.4–8.2)
PROTHROMBIN TIME: 20.8 SEC (ref 9–11.1)
PROTHROMBIN TIME: 26.8 SEC (ref 9–11.1)
RBC # BLD AUTO: 4.04 M/UL (ref 3.8–5.2)
RIGHT ABI: 1.26
RIGHT ANTERIOR TIBIAL: 168 MMHG
RIGHT ARM BP: 146 MMHG
RIGHT ATA BP LEVEL: NORMAL
RIGHT CFA PROX SYS PSV: 198.6 CM/S
RIGHT DIST ATA VELOCITY: 121.3 CM/S
RIGHT MID ATA VELOCITY: 112 CM/S
RIGHT POP A PROX VEL RATIO: 0.86
RIGHT POPLITEAL DIST SYS PSV: 103.7 CM/S
RIGHT POPLITEAL PROX SYS PSV: 100.9 CM/S
RIGHT POSTERIOR TIBIAL: 184 MMHG
RIGHT PROX ATA VELOCITY: 114.4 CM/S
RIGHT PROX PFA A PSV: 117.7 CM/S
RIGHT PROX PTA PSV: 37.6 CM/S
RIGHT PTA MID SYS PSV: 64.6 CM/S
RIGHT SFA DIST VEL RATIO: 0.69
RIGHT SFA MID VEL RATIO: 1.1
RIGHT SFA PROX VEL RATIO: 0.8
RIGHT SUPER FEMORAL DIST SYS PSV: 117.6 CM/S
RIGHT SUPER FEMORAL MID SYS PSV: 170.5 CM/S
RIGHT SUPER FEMORAL PROX SYS PSV: 159.5 CM/S
SODIUM SERPL-SCNC: 138 MMOL/L (ref 136–145)
WBC # BLD AUTO: 10.3 K/UL (ref 3.6–11)

## 2019-04-12 PROCEDURE — 94760 N-INVAS EAR/PLS OXIMETRY 1: CPT

## 2019-04-12 PROCEDURE — 36415 COLL VENOUS BLD VENIPUNCTURE: CPT

## 2019-04-12 PROCEDURE — 74011250637 HC RX REV CODE- 250/637: Performed by: STUDENT IN AN ORGANIZED HEALTH CARE EDUCATION/TRAINING PROGRAM

## 2019-04-12 PROCEDURE — 80053 COMPREHEN METABOLIC PANEL: CPT

## 2019-04-12 PROCEDURE — 99218 HC RM OBSERVATION: CPT

## 2019-04-12 PROCEDURE — 74011250637 HC RX REV CODE- 250/637: Performed by: FAMILY MEDICINE

## 2019-04-12 PROCEDURE — 85025 COMPLETE CBC W/AUTO DIFF WBC: CPT

## 2019-04-12 PROCEDURE — 85610 PROTHROMBIN TIME: CPT

## 2019-04-12 RX ADMIN — LISINOPRIL 20 MG: 20 TABLET ORAL at 09:45

## 2019-04-12 RX ADMIN — ACETAMINOPHEN 650 MG: 325 TABLET ORAL at 17:02

## 2019-04-12 RX ADMIN — METHADONE HYDROCHLORIDE 31 MG: 10 CONCENTRATE ORAL at 09:45

## 2019-04-12 RX ADMIN — Medication 10 ML: at 06:46

## 2019-04-12 RX ADMIN — PANTOPRAZOLE SODIUM 40 MG: 40 TABLET, DELAYED RELEASE ORAL at 06:46

## 2019-04-12 RX ADMIN — ACETAMINOPHEN 650 MG: 325 TABLET ORAL at 03:25

## 2019-04-12 NOTE — DISCHARGE INSTRUCTIONS
HOME DISCHARGE INSTRUCTIONS    Aftab Alejandre / [id number] : 1970    Admission date: 4/10/2019 Discharge date: 2019     Please bring this form with you to show your care provider at your follow-up appointment. Primary care provider:  Lito Hunter NP    Discharging provider:  Shama Casanova MD  - Family Medicine Resident  Dr. Flaco Knight - Attending, Family Medicine     You have been admitted to the hospital with the following diagnoses:    ACUTE DIAGNOSES:  Supratherapeutic INR [R79.1]  . . . . . . . . . . . . . . . . . . . . . . . . . . . . . . . . . . . . . . . . . . . . . . . . . . . . . . . . . . . . . . . . . . . . . . . Zuleima Alexandru FOLLOW-UP CARE RECOMMENDATIONS:    Appointments  Follow-up Information     Follow up With Specialties Details Why Contact Info    Al Isbell NP Nurse Practitioner On 4/15/2019 for hospital follow up at 10.15 am  [address] [place] [address]  [phone]             Please follow up with your PCP regardin. Elevated INR- ensure INR is within normal range   2. Discuss plan to starting eliquis      MEDICATION CHANGES:  1. You were prescribed an eliquis starter pack - Take 10 mg (two 5 mg tablets) by mouth twice a day for 7 days Followed by 5 mg (one 5 mg tablet) by mouth twice a day,    2. Stop taking coumadin (warfarin)    Follow-up tests needed: PT/INR check at PCP office     Pending test results: At the time of your discharge the following test results are still pending: None   Please make sure you review these results with your outpatient follow-up provider(s). Specific symptoms to watch for: chest pain, shortness of breath, fever, chills, nausea, vomiting, diarrhea, change in mentation, falling, weakness, bleeding.      DIET/what to eat:  Diet appropriate with coumadin- avoid foods too high in Vitamin K such as Kale, mustard greens, brussel sprouts etc     ACTIVITY:  Activity as tolerated    Wound care: none     Equipment needed: none     What to do if new or unexpected symptoms occur? If you experience any of the above symptoms (or should other concerns or questions arise after discharge) please call your primary care physician. Return to the emergency room if you cannot get hold of your doctor. · It is very important that you keep your follow-up appointment(s). · Please bring discharge papers, medication list (and/or medication bottles) to your follow-up appointments for review by your outpatient provider(s). · Please check the list of medications and be sure it includes every medication (even non-prescription medications) that your provider wants you to take. · It is important that you take the medication exactly as they are prescribed. · Keep your medication in the bottles provided by the pharmacist and keep a list of the medication names, dosages, and times to be taken in your wallet. · Do not take other medications without consulting your doctor. · If you have any questions about your medications or other instructions, please talk to your nurse or care provider before you leave the hospital.     Information obtained by:     I understand that if any problems occur once I am at home I am to contact my physician. These instructions were explained to me and I had the opportunity to ask questions. I understand and acknowledge receipt of the instructions indicated above.                                                                                                                                                Physician's or R.N.'s Signature                                                                  Date/Time                                                                                                                                              Patient or Representative Signature                                                          Date/Time

## 2019-04-12 NOTE — PROGRESS NOTES
Per Dr. Mina Villeda (rheumatologist) at [facility] no contraindication for pt to be on NOACs and that pt would likely benefit from this with her hx of noncompliance with coumadin. Will place pt on Eliquis and get pricing from . 1:20 PM 
Lorri Barnett,

## 2019-04-12 NOTE — PROGRESS NOTES
Zoë Hamilton [address] Office [phone] Fax [phone] Assessment and Plan  
 
Sonia Ramos is a 50 y.o. female with Takayasu's arteritis (not on any treatment currently), hx of PE (, , ) (on coumadin), chronic pain (on methadone), HTN who presents to the ED from PCP's office due to supra therapeutic INR. 24 Hour Events: No new events 1. Supratherapeutic INR in a setting of coumadin use for hx of PE: POA. INR 12.3 on admission. INR improved to 2.8 this morning. Pt has no signs of active bleeding. s/p PO vitamin K 5mg x1 in ED. Pt cannot take other AC due to arteritis - Heme/onc consulted & following - Pt advised to see if able to obtain a home coumadin monitor with insurance 2. R. Calf pain. Bilateral LE venous doppler neg for DVT. Pain improved this morning  
- f/u LE arterial dopplers 
  
3. HTN:  Continue home lisinopril 20mg daily. 
  
4. Chronic pain: Continue home methadone 31mg daily 5. Takayasu's arteritis: currently not on treatment, follows Dr. Markell Baez at [facility]  
 
FEN/GI - low Na diet Activity - As tolerated DVT prophylaxis - hold GI prophylaxis -  Protonix Disposition - TBD I appreciate the opportunity to participate in the care of this patient, Star Veronica DO Family Medicine Resident Subjective / Objective Subjective Pt states her pain is much improved and she believes this is likely due to her INR improving. Denies SOB or dizziness. Temp (24hrs), Av.8 °F (36.6 °C), Min:97.5 °F (36.4 °C), Max:98.1 °F (36.7 °C) Objective Respiratory:   O2 Device: Room air Visit Vitals /71 (BP 1 Location: Right arm, BP Patient Position: At rest) Pulse 63 Temp 97.5 °F (36.4 °C) Resp 18 Ht 5' 4\" (1.626 m) Wt 135 lb (61.2 kg) SpO2 97% BMI 23.17 kg/m² General: No acute distress. Alert. Cooperative. Head: Normocephalic. Atraumatic. Throat: Mucosa pink. Moist mucous membranes.  No tonsillar exudates or erythema. Palate movement equal bilaterally. Respiratory: CTAB. No w/r/r/c.  
Cardiovascular: RRR. 4/6 systolic murmur present GI: + bowel sounds. Nontender. No rebound tenderness or guarding. Nondistended Extremities: No tenderness to palpation in RLE (calf). No swelling, erythema or edema noted in bilateral LE. Dorsalis pedis and posterior tibial pulses 2+ bilaterally. Skin is soft to touch.   
 
I/O: 
Date 04/11/19 0700 - 04/12/19 0659 04/12/19 0700 - 04/13/19 0336 Shift 0700-1859 1900-0659 24 Hour Total 6233-3449 0931-1380 24 Hour Total  
INTAKE  
P.O. 840  840 120  120  
  P. O. 840  840 120  120 Shift Total(mL/kg) 840(13.7)  840(13.7) 120(2)  120(2) OUTPUT Urine(mL/kg/hr)  850(1.2) 850(0.6) Urine Voided  850 850 Urine Occurrence(s) 1 x  1 x Shift Total(mL/kg)  850(13.9) [id number]  -850 -10 120  120 Weight (kg) 61.2 61.2 61.2 61.2 61.2 61.2  
 
 
CBC: 
Recent Labs 04/12/19 
0503 04/11/19 
0200 04/10/19 
1641 WBC 10.3 14.0* 10.0 HGB 11.9 12.1 13.2 HCT 36.2 36.8 40.1  305 343 Metabolic Panel: 
Recent Labs 04/12/19 
0503 04/11/19 
1259 04/11/19 
0200  04/10/19 
1641   --  138  --  138  
K 3.7  --  3.5  --  3.6   --  108  --  106 CO2 29  --  28  --  25 BUN 11  --  7  --  6  
CREA 0.70  --  0.64  --  0.77 *  --  100  --  151* CA 8.6  --  8.6  --  9.2 ALB 3.2*  --  3.3*  --   --   
SGOT 16  --  15  --   --   
ALT 16  --  16  --   --   
INR 2.8* 4.7* 9.1*   < >  --   
 < > = values in this interval not displayed. For Billing Chief Complaint Patient presents with  Leg Pain Hospital Problems  Date Reviewed: 10/3/2018 Codes Class Noted POA * (Principal) Supratherapeutic INR ICD-10-CM: R79.1 ICD-9-CM: 790.92  4/10/2019 Unknown Takayasu's disease ([facility] ICD-10-CM: M31.4 ICD-9-CM: 446.7  7/10/2015 Yes  Peripheral neuropathy ICD-10-CM: G62.9 ICD-9-CM: 356.9  7/10/2015 Yes Gastroesophageal reflux disease without esophagitis ICD-10-CM: K21.9 ICD-9-CM: 530.81  7/10/2015 Yes Benign essential hypertension ICD-10-CM: I10 
ICD-9-CM: 401.1  7/10/2015 Yes

## 2019-04-12 NOTE — PROGRESS NOTES
Today's Updates/Plan 1. Hematology consulted 2. CM to check pricing on eliquis. CM contacted [facility] practice to inform of pricing. [facility] that they do accept eliqus coupon, Pt will need to have commercial insurance to be eligible. Pt has [employer] insurance. Eliquis coupon placed on Pt's chart. 3. CM will continue to follow for possible discharge needs. Discharge plan 1. Pt is to discharge home with no discharge needs. Belén Staley, BS, [facility]

## 2019-04-12 NOTE — PROGRESS NOTES
Bedside and Verbal shift change report given to Trinity Gauthier, RN (oncoming nurse) by HAYDEE Evangelista (offgoing nurse). Report included the following information SBAR and MAR.

## 2019-04-12 NOTE — PROGRESS NOTES
[facility] [place] [id number] [address] [facility], [address] [place] [address] W: [phone]  F: [phone] Reason for Visit:  
Najma Da Silva is a 50 y.o. female who is seen in consultation at the request of Dr. Kerry Lopez for evaluation of hx of DVT/PE. Hematology / Oncology Treatment History: Hx of DVT/ PE History of Present Illness: Ms. 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 She states she usually takes Warfarin 10mg daily, but has not taken for the past 2 days due to R leg pain. She developed severe R calf pain 2 days ago, went to see her PCP. INR check in PCP's office was 8, and patient was referred to the ER. INR here in ED is 12.3; LE doppler negative for DVT; given Vit K 5 mg x 1 in ED. Pain to rt calf rated 5/10. INR managed by [facility]; has been normal with coumadin 10 mg daily. Pt reports difficulty getting to have INR checked monthly b/c she works out of town. Was told her insurance would not cover a home INR monitor. Was also told she couldn't go on any of the other blood thinners b/c of her arteritis. Ms. Jamal Hancock reports first PE occurred in 1996, was treated with heparin followed by Warfarin for an indefinite time. Was told the PE was related to her arteritis. Went back to the ED within a few days of discharge and was told that there were new clots on the the same side but was kept on coumadin. She is unsure when second PE occurred but was treated with heparin, followed by lovenox followed by coumadin. Third VTE was in 2005 and was also a PE. This was treated with  heparin, lovenox  and then coumadin. Denies ever having been diagnosed with DVTs. Reports in the past has only missed coumadin for approx 2 weeks but then started on ASA.  She was diagnosed with Takayusu's disease in 1992; follows with Dr. Isaiah Bosworth at [facility]; suppose to follow every 6 months but only goes when she has problems. Due to follow in July. She is treated with Prednisone at time of arteritis flares. No recent antibiotics, infections, changes in diet or medications changes. Interval History:  
Feeling better; pain to leg improved. Denies any SOB or CP. Denies N/V. No family at bedside. Past Medical History:  
Diagnosis Date  Bipolar 1 disorder ([facility]  GERD (gastroesophageal reflux disease)  Hypertension  Neuropathy  Ovarian cyst   
 PE (pulmonary embolism)  Presbyesophagus  Reflux  Spinal artery aneurysm ([facility]  Takayasu's arteritis ([email or web address] Past Surgical History:  
Procedure Laterality Date  HX APPENDECTOMY  HX GYN    
 C-sections  HX GYN    
 ablation Social History Tobacco Use  Smoking status: Never Smoker  Smokeless tobacco: Never Used Substance Use Topics  Alcohol use: No  
  Alcohol/week: 0.0 oz Family History Problem Relation Age of Onset  Heart Disease Mother  Stroke Father  Heart Disease Maternal Grandmother  Stroke Paternal Grandmother  Cancer Paternal Grandmother Current Facility-Administered Medications Medication Dose Route Frequency  lisinopril (PRINIVIL, ZESTRIL) tablet 20 mg  20 mg Oral DAILY  pantoprazole (PROTONIX) tablet 40 mg  40 mg Oral ACB  methadone (DOLOPHINE) 10 mg/mL concentrated solution 31 mg  31 mg Oral DAILY  acetaminophen (TYLENOL) tablet 650 mg  650 mg Oral Q6H PRN  
 sodium chloride (NS) flush 5-40 mL  5-40 mL IntraVENous Q8H  
 sodium chloride (NS) flush 5-40 mL  5-40 mL IntraVENous PRN Allergies Allergen Reactions  Tetanus Immune Globulin Swelling Review of Systems: A complete review of systems was obtained, negative except as described above. Physical Exam:  
 
Visit Vitals /71 (BP 1 Location: Right arm, BP Patient Position: At rest) Pulse 63 Temp 97.5 °F (36.4 °C) Resp 18 Ht 5' 4\" (1.626 m)  
Wt 61.2 kg (135 lb) SpO2 97% BMI 23.17 kg/m² ECOG PS: 1 General: No distress Eyes: PERRLA, anicteric sclerae HENT: Atraumatic with normal appearance of ears and nose; OP clear Neck: Supple; no thyromegaly Lymphatic: No cervical, supraclavicular, or axillary adenopathy Respiratory: CTAB, normal respiratory effort CV: Normal rate, regular rhythm, no murmurs, no peripheral edema GI: Soft, nontender, nondistended, no masses, no hepatomegaly, no splenomegaly MS:  Digits without clubbing or cyanosis. Skin: No rashes. Few faint scattered ecchymoses on right shin, larger purple ecchymoses on left upper thigh/hip. No petechiae. Normal temperature, turgor, and texture. Neuro/Psych: Alert, oriented, appropriate affect, normal judgment/insight Results:  
 
Lab Results Component Value Date/Time WBC 10.3 04/12/2019 05:03 AM  
 HGB 11.9 04/12/2019 05:03 AM  
 HCT 36.2 04/12/2019 05:03 AM  
 PLATELET 514 93/30/1586 05:03 AM  
 MCV 89.6 04/12/2019 05:03 AM  
 ABS. NEUTROPHILS 4.7 04/12/2019 05:03 AM  
 
Lab Results Component Value Date/Time Sodium 138 04/12/2019 05:03 AM  
 Potassium 3.7 04/12/2019 05:03 AM  
 Chloride 106 04/12/2019 05:03 AM  
 CO2 29 04/12/2019 05:03 AM  
 Glucose 106 (H) 04/12/2019 05:03 AM  
 BUN 11 04/12/2019 05:03 AM  
 Creatinine 0.70 04/12/2019 05:03 AM  
 GFR est AA >60 04/12/2019 05:03 AM  
 GFR est non-AA >60 04/12/2019 05:03 AM  
 Calcium 8.6 04/12/2019 05:03 AM  
 
Lab Results Component Value Date/Time Bilirubin, total 0.3 04/12/2019 05:03 AM  
 ALT (SGPT) 16 04/12/2019 05:03 AM  
 AST (SGOT) 16 04/12/2019 05:03 AM  
 Alk. phosphatase 103 04/12/2019 05:03 AM  
 Protein, total 6.6 04/12/2019 05:03 AM  
 Albumin 3.2 (L) 04/12/2019 05:03 AM  
 Globulin 3.4 04/12/2019 05:03 AM  
 
Lab Results Component Value Date/Time Sed rate, automated 21 (H) 04/11/2019 02:00 AM  
 TSH 1.370 05/03/2017 02:01 PM  
 
Lab Results Component Value Date/Time  INR 2.8 (H) 04/12/2019 05:03 AM  
 
No results found for: CEA, 967153, C199LT, C125, GCQI942, 2729LT, C153LT, PSA, PSALT, LDH, [id number], HCGTLT, AFP, CHRGLT 
 
4/10/2019 Duplex LE venous right Right lower extremity venous duplex negative for deep venous thrombosis or thrombophlebitis. Left common femoral vein is thrombus free. 4/11/2019 Duplex LE Artery Right No narrowing of the arteries in the right lower extremity. Normal lower right arterial study. Assessment and Recommendations:  
  
Mckinley Church is a 51 yo female with PMH of HTN, PE< Takayasu's arteritis and neuropathy admitted with rt calf pain. 1. Hx of Pulmonary emboli: Pt reports all VTE were attributed to her Takayasu's disease; Currently on Warfarin long term. Notes from PCP as well as based on patient reported history, patient has compliance issues with getting the INR checked regularly. -- Suggest possible check with insurance to see if home monitor is now available. --Discussed with Family Practice: will attempt to reach Dr Chelsie Coffey for possible alternative to coumadin since pt appears to have a difficult time having regular INR checks 2. Supratherapeutic INR: INR 12.3 on admission. Received Vit K 5 mg in ED. Repeat INR this am 9.1. Doppler negative for DVT. Doppler artery study pending. -- As patient does not appear to be bleeding clinically or based on labs, I would allow the INR to drift down to therapeutic range naturally. Continue to monitor. INR 2.8 today: recommend resuming coumadin 3. Takayasu's arteritis: Currently not on treatment: follows with Dr Chelsie Coffey on [facility]: recommend keeping follow up 4. Chronic pain: Currently on Methadone Plan reviewed with Dr Desiderio Kawasaki Signed By: Kvng Mcgee NP

## 2019-04-12 NOTE — DISCHARGE SUMMARY
[address] [facility] [address] [place] [address]   Office [phone]  Fax [phone]       Discharge / Transfer / Off-Service Note     Name: Katharine Thayer MRN: [id number]  Sex: Female   YOB: 1970  Age: 50 y.o. PCP: Willie Franco NP     Date of admission: 4/10/2019  Date of discharge/transfer: 4/12/2019    Attending physician at admission: Dr. Villa Morin    Attending physician at discharge/transfer: Dr. Curvin Dandy    Resident physician at discharge/transfer: Corky Yoo DO     Consultants during hospitalization  Dr. Ciara Aldrich- Hematology      Admission diagnoses   Supratherapeutic INR [R79.1]    Recommended follow-up after discharge  1. PCP: Dr. Lizeth Gonzáles    Things to follow up on with PCP:  1. Ensure that INR is within normal range  2. Discuss plan to start Eliquis      Medication Changes:    START Eliquis taking two tablets (10 mg) twice daily for 7 days through 4/19. Start taking only one tablet (5 mg) twice daily on 4/20 until further notice. STOP taking Coumadin     History of Present Illness  Per admitting provider,    Katharine Thayer is a 50 y.o. female with Takayasu's arteritis (not on any treatment currently), hx of PE (1996, 2003, 2009) (on coumadin), chronic pain (on methadone), HTN who presents to the ED from PCP's office due to supra therapeutic INR. Pt presented to PCP office on day of admission due to R. Calf pain that started 2 days ago. No trauma prior to pain. INR in PCP office was 8. Pt currently on coumadin 10mg daily, last done was  4/7/19 per Pt. Stopped taking her coumadin after her R. Calf pain started. Per Pt: previous supra therapeutic INR several years ago was associated with calf pain which is why she stopped taking coumadin. Pt denies dyspnea, leg swelling, erythema/ warmth in RLE, HA, CP, N/V/D. She also denies hematuria, hematochezia/melena or hemoptysis. Endorses some intermittent bruising which is her baseline.    In the ER, pertinent vital signs were: T98.4F, P: 83,  BP: 179/69, O2: 99% RA. Labs were remarkable for INR: 12.3 which was 12.3 when repeated. UDS: Positive for Opiates and methadone. (Pt did receive opioids prior to UDS)   Bilateral LE venous dopplers: Neg for DVT     Pt was treated with: Norco for pain control and PO vitamin K 5mg x1.     HOSPITAL COURSE    1. Supratherapeutic INR in a setting of coumadin use for hx of PE: Improved from 12.3 to 2.8 during admission s/p Vitamin K 5 mg once. Case was discussed with Dr. Christal Pierre (pt's rheumatologist at [facility]) who stated that patient will benefit from NOACs with pt's hx of non compliance with Coumadin.   - Pt started on Eliquis regimen: Eliquis taking two tablets (10 mg) twice daily for 7 days through 4/19. Start taking only one tablet (5 mg) twice daily on 4/20 until further notice. - Pt to follow up at PCPs office on 4/15/19 to ensure INR is in the normal range      2. R. Calf pain. Arterial & venous duplex were normal. Pain resolved during the course of admission.     3. HTN:  Continue home lisinopril 20mg daily.     4. Chronic pain: Continue home methadone 31mg daily      5. Takayasu's arteritis: currently not on treatment, follows Dr. Christal Pierre at [facility]      Physical exam at discharge:       General: No acute distress. Alert. Cooperative. Head: Normocephalic. Atraumatic. Throat: Mucosa pink. Moist mucous membranes. No tonsillar exudates or erythema. Palate movement equal bilaterally. Respiratory: CTAB. No w/r/r/c.   Cardiovascular: IRB. 8/6 systolic murmur present   GI: + bowel sounds. Nontender. No rebound tenderness or guarding. Nondistended   Extremities: No tenderness to palpation in RLE (calf). No swelling, erythema or edema noted in bilateral LE. Dorsalis pedis and posterior tibial pulses 2+ bilaterally. Skin is soft to touch.        Condition at discharge: Stable.     Labs  Recent Labs     04/12/19  0503 04/11/19  0200 04/10/19  1641   WBC 10.3 14.0* 10. 0   HGB 11.9 12.1 13.2   HCT 36.2 36.8 40.1    305 343     Recent Labs     04/12/19  0503 04/11/19  0200 04/10/19  1641    138 138   K 3.7 3.5 3.6    108 106   CO2 29 28 25   BUN 11 7 6   CREA 0.70 0.64 0.77   * 100 151*   CA 8.6 8.6 9.2     Recent Labs     04/12/19  0503 04/11/19  0200   SGOT 16 15   ALT 16 16    114   TBILI 0.3 0.4   TP 6.6 7.0   ALB 3.2* 3.3*   GLOB 3.4 3.7     Recent Labs     04/12/19  0503 04/11/19  1259 04/11/19  0200   INR 2.8* 4.7* 9.1*   PTP 26.8* 44.0* 81.8*     Cultures  · None    Procedures / Diagnostic Studies  · None    Imaging  Results from [place] encounter on 10/08/13   XR ABD ACUTE W 1 V CHEST    Narrative **Final Report**       ICD Codes / Adm. Diagnosis: 591596   / Abdominal Cramping    Examination:  CR ABDOMEN ACUTE W PA CHEST  - [id number] - Oct  8 2013  2:07PM  Accession No:  [id number]  Reason:  abdominal pain      REPORT:  EXAM:  CR ABDOMEN ACUTE W PA CHEST    INDICATION: Abdominal pain    COMPARISON: None    TECHNIQUE: Erect chest and abdomen and supine abdomen radiographs (acute   abdominal series). FINDINGS: Cardiac size is within normal limits. Aorta is atherosclerotic. The lungs and pleural spaces are clear. No free air under the diaphragm. There is scattered air within the colon and small bowel. No bowel dilatation   to suggest obstruction. No evidence of free intraperitoneal air. No calcification projected over the kidneys. The osseous structures are age   appropriate. IMPRESSION:  Nonobstructive bowel gas pattern without evidence of free air. Aortic atherosclerosis is advanced for age. Signing/Reading Doctor: Zetta Leventhal ([id number])    Approved: Zetta Leventhal ([id number])  Oct  8 2013  2:31PM                                         No results found for this or any previous visit. No results found for this or any previous visit. No procedure found.       Chronic diagnoses   Problem List as of 4/12/2019 Date Reviewed: 10/3/2018          Codes Class Noted - Resolved    * (Principal) Supratherapeutic INR ICD-10-CM: R79.1  ICD-9-CM: 790.92  4/10/2019 - Present        Encounter for current long-term use of anticoagulants ICD-10-CM: Z79.01  ICD-9-CM: V58.61  6/1/2018 - Present        Carotid artery stenosis ICD-10-CM: I65.29  ICD-9-CM: 433.10  7/27/2015 - Present        Takayasu's disease ([facility] ICD-10-CM: M31.4  ICD-9-CM: 446.7  7/10/2015 - Present        Aneurysm of other specified artery(442.89) ICD-10-CM: I72.8  ICD-9-CM: 442.89  7/10/2015 - Present        Pulmonary embolism ([facility] ICD-10-CM: I26.99  ICD-9-CM: 415.19  7/10/2015 - Present        Peripheral neuropathy ICD-10-CM: G62.9  ICD-9-CM: 356.9  7/10/2015 - Present        Bipolar I disorder, most recent episode (or current) unspecified ICD-10-CM: F31.9  ICD-9-CM: 296.7  7/10/2015 - Present        Gastroesophageal reflux disease without esophagitis ICD-10-CM: K21.9  ICD-9-CM: 530.81  7/10/2015 - Present        Benign essential hypertension ICD-10-CM: I10  ICD-9-CM: 401.1  7/10/2015 - Present        Insomnia ICD-10-CM: G47.00  ICD-9-CM: 780.52  7/10/2015 - Present              Discharge/Transfer Medications  Current Discharge Medication List      START taking these medications    Details   apixaban (ELIQUIS) 5 mg tablet Take two tablets (10 mg) twice daily for 7 days through 4/19. Start taking only one tablet (5 mg) twice daily on 4/20 until further notice. Qty: 90 Tab, Refills: 0         STOP taking these medications       warfarin (COUMADIN) 10 mg tablet Comments:   Reason for Stopping:                Diet:  Regular diet.     Activity:  As tolerated    Disposition: Home or Self Care    Discharge instructions to patient/family  Please seek medical attention for any new or worsening symptoms particularly fever, chest pain, shortness of breath, abdominal pain, nausea, vomiting    Follow up plans/appointments  Follow-up Information     Follow up With Specialties Details Why Contact Info    Chepe Godoy NP Nurse Practitioner On 4/15/2019 for hospital follow up at 10.15 am  [address] [place] [id number]  [phone]             Lorri Barnett DO  Family Medicine Resident       For Billing    Chief Complaint   Patient presents with   [facility] Leg Pain       Hospital Problems  Date Reviewed: 10/3/2018          Codes Class Noted POA    * (Principal) Supratherapeutic INR ICD-10-CM: R79.1  ICD-9-CM: 790.92  4/10/2019 Unknown        [facility]'s [facility] ([facility] ICD-10-CM: M31.4  ICD-9-CM: 446.7  7/10/2015 Yes        Peripheral neuropathy ICD-10-CM: G62.9  ICD-9-CM: 356.9  7/10/2015 Yes        Gastroesophageal reflux disease without esophagitis ICD-10-CM: K21.9  ICD-9-CM: 530.81  7/10/2015 Yes        Benign essential hypertension ICD-10-CM: I10  ICD-9-CM: 401.1  7/10/2015 Yes

## 2019-04-15 ENCOUNTER — OFFICE VISIT (OUTPATIENT)
Dept: FAMILY MEDICINE CLINIC | Age: 49
End: 2019-04-15

## 2019-04-15 VITALS
HEIGHT: 64 IN | RESPIRATION RATE: 18 BRPM | SYSTOLIC BLOOD PRESSURE: 111 MMHG | TEMPERATURE: 98.3 F | WEIGHT: 141 LBS | HEART RATE: 81 BPM | OXYGEN SATURATION: 97 % | DIASTOLIC BLOOD PRESSURE: 73 MMHG | BODY MASS INDEX: 24.07 KG/M2

## 2019-04-15 DIAGNOSIS — M31.4 TAKAYASU'S DISEASE (HCC): ICD-10-CM

## 2019-04-15 DIAGNOSIS — F41.1 GENERALIZED ANXIETY DISORDER: Primary | ICD-10-CM

## 2019-04-15 DIAGNOSIS — Z79.01 ENCOUNTER FOR CURRENT LONG-TERM USE OF ANTICOAGULANTS: ICD-10-CM

## 2019-04-15 DIAGNOSIS — K59.04 CHRONIC IDIOPATHIC CONSTIPATION: ICD-10-CM

## 2019-04-15 RX ORDER — HYDROXYZINE 50 MG/1
50 TABLET, FILM COATED ORAL
Qty: 90 TAB | Refills: 1 | Status: SHIPPED | OUTPATIENT
Start: 2019-04-15 | End: 2019-04-25

## 2019-04-15 RX ORDER — SENNOSIDES 8.6 MG/1
1 TABLET ORAL DAILY
Qty: 90 TAB | Refills: 1 | Status: SHIPPED | OUTPATIENT
Start: 2019-04-15

## 2019-04-15 NOTE — PROGRESS NOTES
Chief Complaint   Patient presents with   [facility] Follow Up     Patient in office today for hosp f/u. Pt was seen at [facility] on Wednesday due to abnormal iron levels and severe rt calf pain. Pt was discontinued off coumadin,and prescribed Eliquis. Pt would like to stay on medication due to job schedule.

## 2019-04-15 NOTE — PROGRESS NOTES
Chief Complaint   Patient presents with   [facility] Follow Up     Patient in office today for hosp f/u. Pt was seen at [facility] on Wednesday due to abnormal iron levels and severe rt calf pain. Pt was discontinued off coumadin,and prescribed Eliquis. Pt would like to stay on medication due to job schedule. Pt has not been able to fill the starter pack. kelli thinks they will get it today. Had supratherapeutic INR of 12. They transitioned her to Eliquis. Dopplers were negative for DVT. Having to take laxatives once a week to help her have a BM. Causing her to have blood stools and tearing. Has been drinking OJ with metamucil. Has previously tried and failed linzess due to SEs. Pt is trying to stop her methadone. Currently on 31 mg daily. Has noticed increased anxiety with tapering down. Pain doctor recommended she discuss starting klonopin as needed. Would like to take as needed. Usually anxiety is worse at night when she is trying to go to bed. Denies any other concerns at this time. Chief Complaint   Patient presents with   [facility] Follow Up     she is a 50y.o. year old female who presents for evalution. Reviewed PmHx, RxHx, FmHx, SocHx, AllgHx and updated and dated in the chart. Review of Systems - negative except as listed above in the HPI    Objective:     Vitals:    04/15/19 1028   BP: 111/73   Pulse: 81   Resp: 18   Temp: 98.3 °F (36.8 °C)   TempSrc: Oral   SpO2: 97%   Weight: 141 lb (64 kg)   Height: 5' 4\" (1.626 m)     Physical Examination: General appearance - alert, well appearing, and in no distress  Mental status - anxious  Chest - clear to auscultation, no wheezes, rales or rhonchi, symmetric air entry  Heart - normal rate, regular rhythm, normal S1, S2, no murmurs    Assessment/ Plan:   Diagnoses and all orders for this visit:    1. Generalized anxiety disorder  -     hydrOXYzine HCl (ATARAX) 50 mg tablet;  Take 1 Tab by mouth daily as needed for Anxiety for up to 10 days. Start atarax as needed for for acute anxiety sx. Reviewed SEs/ADRs of medication. Recommended pt avoid benzos due to history of narcotic dependency. 2. Chronic idiopathic constipation  -     senna (SENNA) 8.6 mg tablet; Take 1 Tab by mouth daily. Start sennda daily. Reviewed SEs/ADRs of medication. 3. Takayasu's disease ([facility] / 4. Encounter for current long-term use of anticoagulants  Pt is transitioning to Eliquis. Enc to call [facility] and make sure they are receiving starter pack today, otherwise will need to send in alternative. Reviewed medication SEs/ADRs. Follow-up and Dispositions    · Return if symptoms worsen or fail to improve. I have discussed the diagnosis with the patient and the intended plan as seen in the above orders. The patient has received an after-visit summary and questions were answered concerning future plans. Medication Side Effects and Warnings were discussed with patient: yes  Patient Labs were reviewed and or requested: yes  Patient Past Records were reviewed and or requested  yes  Patient / Caregiver Understanding of treatment plan was verbalized during office visit MARY Crandall    Patient Instructions   Apixaban (By mouth)   Apixaban (a-PIX-a-ban)  Treats and prevents blood clots. This medicine is a blood thinner. Brand Name(s): Eliquis   There may be other brand names for this medicine. When This Medicine Should Not Be Used: This medicine is not right for everyone. Do not use it if you had an allergic reaction to apixaban or you have active bleeding. How to Use This Medicine:   Tablet  · Your doctor will tell you how much medicine to use. Do not use more than directed. · If you are not able to swallow the tablets whole, they may be crushed and mixed in water, 5% dextrose in water (D5W), apple juice, or applesauce.  The crushed tablets may be mixed with 60 mL of water or D5W dose and given through a nasogastric tube (NGT). · This medicine should come with a Medication Guide. Ask your pharmacist for a copy if you do not have one. · Missed dose: Take a dose as soon as you remember. If it is almost time for your next dose, wait until then and take a regular dose. Do not take extra medicine to make up for a missed dose. · Store the medicine in a closed container at room temperature, away from heat, moisture, and direct light. Drugs and Foods to Avoid:   Ask your doctor or pharmacist before using any other medicine, including over-the-counter medicines, vitamins, and herbal products. · Some medicines can affect how apixaban works. Tell your doctor if you are using any of the following:   ¨ Carbamazepine, clarithromycin, itraconazole, ketoconazole, phenytoin, rifampin, ritonavir, Rosa Maria's wort  ¨ Blood thinner (including clopidogrel, heparin, prasugrel, warfarin)  ¨ Medicine to treat depression  ¨ NSAID pain or arthritis medicine (including aspirin, celecoxib, diclofenac, ibuprofen, naproxen)  Warnings While Using This Medicine:   · Tell your doctor if you are pregnant or breastfeeding, or if you have kidney disease, liver disease, bleeding problems, or an artificial heart valve. · Do not stop using this medicine suddenly without asking your doctor. You might have a higher risk of stroke for a short time after you stop using this medicine. · This medicine increases your risk for bleeding that can become serious if not controlled. You may also bruise easily, and it may take longer than usual for bleeding to stop. · This medicine may increase your risk for blood clots in your spine or back if you undergo an epidural or spinal puncture. This could lead to paralysis. Tell your doctor if you ever had spine problems or back surgery. · Tell any doctor or dentist who treats you that you are using this medicine.  With your doctor's supervision, you may need to stop using this medicine several days before you have surgery or medical tests. · Your doctor will do lab tests at regular visits to check on the effects of this medicine. Keep all appointments. · Keep all medicine out of the reach of children. Never share your medicine with anyone. Possible Side Effects While Using This Medicine:   Call your doctor right away if you notice any of these side effects:  · Allergic reaction: Itching or hives, swelling in your face or hands, swelling or tingling in your mouth or throat, chest tightness, trouble breathing  · Change in how much or how often you urinate, red or pink urine  · Chest pain, trouble breathing  · Coughing up blood, vomiting blood or material that looks like coffee grounds  · Numbness, tingling, or muscle weakness in your legs or feet  · Red or black, tarry stools  · Unusual bleeding, bruising, or weakness  If you notice other side effects that you think are caused by this medicine, tell your doctor. Call your doctor for medical advice about side effects. You may report side effects to FDA at 9-270-FDA-6483  © 2017 [facility] Information is for End User's use only and may not be sold, redistributed or otherwise used for commercial purposes. The above information is an  only. It is not intended as medical advice for individual conditions or treatments. Talk to your doctor, nurse or pharmacist before following any medical regimen to see if it is safe and effective for you.

## 2019-04-15 NOTE — LETTER
NOTIFICATION RETURN TO WORK 
 
4/15/2019 10:52 AM 
 
Ms. Najma Da Silva [address] [place] [id number] To Whom It May Concern: 
 
Najma Da Silva is currently under the care of [address]. She will return to work on: Tuesday April 16th, 2019. If there are questions or concerns please have the patient contact our office.  
 
 
 
Sincerely, 
 
 
Crystal Robles NP

## 2019-04-16 RX ORDER — LISINOPRIL 20 MG/1
TABLET ORAL
Qty: 30 TAB | Refills: 0 | Status: SHIPPED | OUTPATIENT
Start: 2019-04-16 | End: 2019-05-30 | Stop reason: SDUPTHER

## 2019-05-31 RX ORDER — LISINOPRIL 20 MG/1
TABLET ORAL
Qty: 30 TAB | Refills: 0 | Status: SHIPPED | OUTPATIENT
Start: 2019-05-31 | End: 2019-07-11 | Stop reason: SDUPTHER

## 2019-07-12 RX ORDER — LISINOPRIL 20 MG/1
TABLET ORAL
Qty: 30 TAB | Refills: 0 | Status: SHIPPED | OUTPATIENT
Start: 2019-07-12 | End: 2019-08-08 | Stop reason: SDUPTHER

## 2019-08-08 RX ORDER — LISINOPRIL 20 MG/1
20 TABLET ORAL DAILY
Qty: 30 TAB | Refills: 1 | Status: SHIPPED | OUTPATIENT
Start: 2019-08-08 | End: 2019-12-22

## 2019-12-22 RX ORDER — LISINOPRIL 20 MG/1
TABLET ORAL
Qty: 30 TAB | Refills: 1 | Status: SHIPPED | OUTPATIENT
Start: 2019-12-22 | End: 2020-02-25

## 2020-01-20 DIAGNOSIS — K21.9 GASTROESOPHAGEAL REFLUX DISEASE WITHOUT ESOPHAGITIS: ICD-10-CM

## 2020-01-21 RX ORDER — OMEPRAZOLE 40 MG/1
CAPSULE, DELAYED RELEASE ORAL
Qty: 30 CAP | Refills: 5 | Status: SHIPPED | OUTPATIENT
Start: 2020-01-21 | End: 2021-05-25 | Stop reason: SDUPTHER

## 2020-02-25 RX ORDER — LISINOPRIL 20 MG/1
TABLET ORAL
Qty: 30 TAB | Refills: 1 | Status: SHIPPED | OUTPATIENT
Start: 2020-02-25 | End: 2021-05-25

## 2020-02-25 RX ORDER — LISINOPRIL 20 MG/1
TABLET ORAL
Qty: 30 TAB | Refills: 1 | Status: SHIPPED | OUTPATIENT
Start: 2020-02-25 | End: 2020-09-08

## 2020-09-08 RX ORDER — LISINOPRIL 20 MG/1
TABLET ORAL
Qty: 30 TAB | Refills: 1 | Status: SHIPPED | OUTPATIENT
Start: 2020-09-08 | End: 2020-12-16 | Stop reason: SDUPTHER

## 2020-12-16 RX ORDER — LISINOPRIL 20 MG/1
TABLET ORAL
Qty: 30 TAB | Refills: 1 | Status: SHIPPED | OUTPATIENT
Start: 2020-12-16 | End: 2021-05-25

## 2021-01-12 ENCOUNTER — TELEPHONE (OUTPATIENT)
Dept: FAMILY MEDICINE CLINIC | Age: 51
End: 2021-01-12

## 2021-01-12 NOTE — TELEPHONE ENCOUNTER
Left VV on phone [phone] stating cancelled 01.21.2021 CPE as patient wanted and for her to call office back to reschedule.

## 2021-05-25 ENCOUNTER — OFFICE VISIT (OUTPATIENT)
Dept: FAMILY MEDICINE CLINIC | Age: 51
End: 2021-05-25
Payer: COMMERCIAL

## 2021-05-25 VITALS
TEMPERATURE: 99.2 F | RESPIRATION RATE: 16 BRPM | DIASTOLIC BLOOD PRESSURE: 80 MMHG | SYSTOLIC BLOOD PRESSURE: 152 MMHG | HEART RATE: 89 BPM | BODY MASS INDEX: 28.85 KG/M2 | HEIGHT: 64 IN | WEIGHT: 169 LBS | OXYGEN SATURATION: 97 %

## 2021-05-25 DIAGNOSIS — K21.9 GASTROESOPHAGEAL REFLUX DISEASE WITHOUT ESOPHAGITIS: ICD-10-CM

## 2021-05-25 DIAGNOSIS — I10 BENIGN ESSENTIAL HYPERTENSION: Primary | ICD-10-CM

## 2021-05-25 DIAGNOSIS — J06.9 ACUTE UPPER RESPIRATORY INFECTION: ICD-10-CM

## 2021-05-25 DIAGNOSIS — J45.20 MILD INTERMITTENT ASTHMA WITHOUT COMPLICATION: ICD-10-CM

## 2021-05-25 PROCEDURE — 99214 OFFICE O/P EST MOD 30 MIN: CPT | Performed by: FAMILY MEDICINE

## 2021-05-25 RX ORDER — AMLODIPINE BESYLATE 10 MG/1
10 TABLET ORAL DAILY
COMMUNITY

## 2021-05-25 RX ORDER — BUPRENORPHINE AND NALOXONE 8; 2 MG/1; MG/1
1 FILM, SOLUBLE BUCCAL; SUBLINGUAL DAILY
COMMUNITY

## 2021-05-25 RX ORDER — OMEPRAZOLE 40 MG/1
CAPSULE, DELAYED RELEASE ORAL
Qty: 30 CAPSULE | Refills: 5 | Status: SHIPPED | OUTPATIENT
Start: 2021-05-25

## 2021-05-25 RX ORDER — ALBUTEROL SULFATE 90 UG/1
2 AEROSOL, METERED RESPIRATORY (INHALATION)
Qty: 1 INHALER | Refills: 2 | Status: CANCELLED | OUTPATIENT
Start: 2021-05-25

## 2021-05-25 RX ORDER — LISINOPRIL AND HYDROCHLOROTHIAZIDE 12.5; 2 MG/1; MG/1
1 TABLET ORAL DAILY
Qty: 90 TABLET | Refills: 1 | Status: SHIPPED | OUTPATIENT
Start: 2021-05-25 | End: 2021-08-30

## 2021-05-25 RX ORDER — ALBUTEROL SULFATE 2.5 MG/.5ML
2.5 SOLUTION RESPIRATORY (INHALATION)
Qty: 15 ML | Refills: 1 | Status: SHIPPED | OUTPATIENT
Start: 2021-05-25

## 2021-05-25 RX ORDER — ALBUTEROL SULFATE 90 UG/1
2 AEROSOL, METERED RESPIRATORY (INHALATION)
Qty: 1 INHALER | Refills: 5 | Status: SHIPPED | OUTPATIENT
Start: 2021-05-25 | End: 2022-05-20

## 2021-05-25 RX ORDER — ASPIRIN 325 MG
325 TABLET ORAL DAILY
COMMUNITY

## 2021-05-25 RX ORDER — AZITHROMYCIN 250 MG/1
TABLET, FILM COATED ORAL
Qty: 6 TABLET | Refills: 0 | Status: SHIPPED | OUTPATIENT
Start: 2021-05-25

## 2021-05-25 NOTE — PROGRESS NOTES
Chief Complaint   Patient presents with    Breathing Problem     Airway restriction    Hypertension    Medication Refill    Rash     Left wrist, right arm     1. Have you been to the ER, urgent care clinic since your last visit? Hospitalized since your last visit? No    2. Have you seen or consulted any other health care providers outside of the [address] [place] [address] since your last visit? Include any pap smears or colon screening. Yes Where: [facility]: [facility]: Dr eSrge Banuelos             Chief Complaint   Patient presents with    Breathing Problem     Airway restriction    Hypertension    Medication Refill    Rash     Left wrist, right arm     She is a 48 y.o. female who presents for evalution. Reviewed PmHx, RxHx, FmHx, SocHx, AllgHx and updated and dated in the chart. Patient Active Problem List    Diagnosis    Supratherapeutic INR    Encounter for current long-term use of anticoagulants    Carotid artery stenosis    Takayasu's disease ([address]    Aneurysm of other specified artery([address]89)    [facility] embolism ([facility] [address]    Peripheral neuropathy    Bipolar I disorder, most recent episode (or current) unspecified    Gastroesophageal reflux disease without esophagitis    Benign essential hypertension    Insomnia       Review of Systems - negative except as listed above in the HPI    Objective:     Vitals:    05/25/21 1334   BP: (!) 152/80   Pulse: 89   Resp: 16   Temp: 99.2 °F (37.3 °C)   TempSrc: Oral   SpO2: 97%   Weight: 169 lb (76.7 kg)   Height: 5' 4\" (1.626 m)         Assessment/ Plan:   Diagnoses and all orders for this visit:    1. Benign essential hypertension  -not at goal  -inc ace    2. Gastroesophageal reflux disease without esophagitis  -     omeprazole (PRILOSEC) 40 mg capsule; take 1 capsule by mouth once daily    3.  Mild intermittent asthma without complication  -     albuterol sulfate (PROVENTIL;VENTOLIN) 2.5 mg/0.5 mL nebu nebulizer solution; 0.5 mL by Nebulization route every four (4) hours as needed for Wheezing.  -add rx and inc allg rx flonase to bid for one week    Other orders  -     albuterol (PROVENTIL HFA, VENTOLIN HFA, PROAIR HFA) 90 mcg/actuation inhaler; Take 2 Puffs by inhalation every four (4) hours as needed for Wheezing for up to 360 days. -     lisinopril-hydroCHLOROthiazide (PRINZIDE, ZESTORETIC) 20-12.5 mg per tablet; Take 1 Tablet by mouth daily. URI\"-add Zpack        I have discussed the diagnosis with the patient and the intended plan as seen in the above orders. The patient understands and agrees with the plan. The patient has received an after-visit summary and questions were answered concerning future plans. Medication Side Effects and Warnings were discussed with patient  Patient Labs were reviewed and or requested:  Patient Past Records were reviewed and or requested    Siomara Cisneros M.D. There are no Patient Instructions on file for this visit.

## 2021-05-26 RX ORDER — BENZONATATE 100 MG/1
100 CAPSULE ORAL
Qty: 60 CAPSULE | Refills: 1 | Status: SHIPPED | OUTPATIENT
Start: 2021-05-26 | End: 2021-06-02

## 2021-06-03 ENCOUNTER — DOCUMENTATION ONLY (OUTPATIENT)
Dept: FAMILY MEDICINE CLINIC | Age: 51
End: 2021-06-03

## 2021-06-03 NOTE — PROGRESS NOTES
Multiple attempts to contact patient for VV scheduled 6/3/21at 8:00. No answer LVM to return call to office for check in or reschedule.

## 2021-07-01 ENCOUNTER — TELEPHONE (OUTPATIENT)
Dept: FAMILY MEDICINE CLINIC | Age: 51
End: 2021-07-01

## 2021-07-01 NOTE — TELEPHONE ENCOUNTER
Patient should be okay to take the vaccine. However, if she is concerned or worried that I think is perfectly fine that she abstain from taking it. The [address] vaccines would be the best option.     Dr. Michael Ponce

## 2021-07-01 NOTE — TELEPHONE ENCOUNTER
Returned call to patient. Informed as per Dr. Ayaan Jefferson. She states she will call the her pharmacy and see vaccine they give.

## 2021-07-01 NOTE — TELEPHONE ENCOUNTER
Returned call to patient. She has concerns about covid vaccines. 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 She is prone to blood clots in her lungs. She is unable to be on warfarin due to Takayasu's Disease. She is allergic to tetanis vaccines. He rheumatologist said she should be ok with it, but patient would like to know Dr. Eneida Corona thoughts on whether or not it will be safe for her to get.

## 2021-08-30 RX ORDER — LISINOPRIL AND HYDROCHLOROTHIAZIDE 12.5; 2 MG/1; MG/1
1 TABLET ORAL DAILY
Qty: 90 TABLET | Refills: 1 | Status: SHIPPED | OUTPATIENT
Start: 2021-08-30

## 2022-03-19 PROBLEM — Z79.01 ENCOUNTER FOR CURRENT LONG-TERM USE OF ANTICOAGULANTS: Status: ACTIVE | Noted: 2018-06-01

## 2022-03-19 PROBLEM — R79.1 SUPRATHERAPEUTIC INR: Status: ACTIVE | Noted: 2019-04-10

## 2022-06-12 NOTE — PATIENT INSTRUCTIONS
Problem: At Risk for Falls  Goal: # Patient does not fall  Outcome: Outcome Met, Continue evaluating goal progress toward completion  Goal: # Takes action to control fall-related risks  Outcome: Outcome Met, Continue evaluating goal progress toward completion  Goal: # Verbalizes understanding of fall risk/precautions  Description: Document education using the patient education activity  Outcome: Outcome Met, Continue evaluating goal progress toward completion     Problem: Elimination-Bowel, Altered:Diarrhea  Goal: Bowel elimination pattern returned to baseline  Outcome: Outcome Met, Continue evaluating goal progress toward completion  Goal: Verbalizes understanding of diarrhea management  Description: Document on Patient Education Activity  Outcome: Outcome Met, Continue evaluating goal progress toward completion      Apixaban (By mouth)   Apixaban (a-PIX-a-ban)  Treats and prevents blood clots. This medicine is a blood thinner. Brand Name(s): Eliquis   There may be other brand names for this medicine. When This Medicine Should Not Be Used: This medicine is not right for everyone. Do not use it if you had an allergic reaction to apixaban or you have active bleeding. How to Use This Medicine:   Tablet  · Your doctor will tell you how much medicine to use. Do not use more than directed. · If you are not able to swallow the tablets whole, they may be crushed and mixed in water, 5% dextrose in water (D5W), apple juice, or applesauce. The crushed tablets may be mixed with 60 mL of water or D5W dose and given through a nasogastric tube (NGT). · This medicine should come with a Medication Guide. Ask your pharmacist for a copy if you do not have one. · Missed dose: Take a dose as soon as you remember. If it is almost time for your next dose, wait until then and take a regular dose. Do not take extra medicine to make up for a missed dose. · Store the medicine in a closed container at room temperature, away from heat, moisture, and direct light. Drugs and Foods to Avoid:   Ask your doctor or pharmacist before using any other medicine, including over-the-counter medicines, vitamins, and herbal products. · Some medicines can affect how apixaban works. Tell your doctor if you are using any of the following:   ¨ Carbamazepine, clarithromycin, itraconazole, ketoconazole, phenytoin, rifampin, ritonavir, Rosa Maria's wort  ¨ Blood thinner (including clopidogrel, heparin, prasugrel, warfarin)  ¨ Medicine to treat depression  ¨ NSAID pain or arthritis medicine (including aspirin, celecoxib, diclofenac, ibuprofen, naproxen)  Warnings While Using This Medicine:   · Tell your doctor if you are pregnant or breastfeeding, or if you have kidney disease, liver disease, bleeding problems, or an artificial heart valve.   · Do not stop using this medicine suddenly without asking your doctor. You might have a higher risk of stroke for a short time after you stop using this medicine. · This medicine increases your risk for bleeding that can become serious if not controlled. You may also bruise easily, and it may take longer than usual for bleeding to stop. · This medicine may increase your risk for blood clots in your spine or back if you undergo an epidural or spinal puncture. This could lead to paralysis. Tell your doctor if you ever had spine problems or back surgery. · Tell any doctor or dentist who treats you that you are using this medicine. With your doctor's supervision, you may need to stop using this medicine several days before you have surgery or medical tests. · Your doctor will do lab tests at regular visits to check on the effects of this medicine. Keep all appointments. · Keep all medicine out of the reach of children. Never share your medicine with anyone. Possible Side Effects While Using This Medicine:   Call your doctor right away if you notice any of these side effects:  · Allergic reaction: Itching or hives, swelling in your face or hands, swelling or tingling in your mouth or throat, chest tightness, trouble breathing  · Change in how much or how often you urinate, red or pink urine  · Chest pain, trouble breathing  · Coughing up blood, vomiting blood or material that looks like coffee grounds  · Numbness, tingling, or muscle weakness in your legs or feet  · Red or black, tarry stools  · Unusual bleeding, bruising, or weakness  If you notice other side effects that you think are caused by this medicine, tell your doctor. Call your doctor for medical advice about side effects. You may report side effects to FDA at 1-913-FDA-6809  © 2017 [facility] Information is for End User's use only and may not be sold, redistributed or otherwise used for commercial purposes. The above information is an  only. It is not intended as medical advice for individual conditions or treatments. Talk to your doctor, nurse or pharmacist before following any medical regimen to see if it is safe and effective for you.

## 2022-11-21 ENCOUNTER — HOSPITAL ENCOUNTER (EMERGENCY)
Age: 52
Discharge: HOME | End: 2022-11-21
Attending: EMERGENCY MEDICINE
Payer: COMMERCIAL

## 2022-11-21 ENCOUNTER — APPOINTMENT (OUTPATIENT)
Dept: CT IMAGING | Age: 52
End: 2022-11-21
Attending: EMERGENCY MEDICINE
Payer: COMMERCIAL

## 2022-11-21 VITALS
TEMPERATURE: 97.9 F | OXYGEN SATURATION: 97 % | SYSTOLIC BLOOD PRESSURE: 155 MMHG | HEART RATE: 82 BPM | RESPIRATION RATE: 16 BRPM | DIASTOLIC BLOOD PRESSURE: 88 MMHG

## 2022-11-21 DIAGNOSIS — J18.9 PNEUMONIA OF RIGHT MIDDLE LOBE DUE TO INFECTIOUS ORGANISM: Primary | ICD-10-CM

## 2022-11-21 LAB
ALBUMIN SERPL-MCNC: 3.6 G/DL (ref 3.5–5)
ALBUMIN/GLOB SERPL: 0.9 {RATIO} (ref 1.1–2.2)
ALP SERPL-CCNC: 157 U/L (ref 45–117)
ALT SERPL-CCNC: 17 U/L (ref 12–78)
ANION GAP SERPL CALC-SCNC: 3 MMOL/L (ref 5–15)
AST SERPL-CCNC: 12 U/L (ref 15–37)
BASOPHILS # BLD: 0.2 K/UL (ref 0–0.1)
BASOPHILS NFR BLD: 1 % (ref 0–1)
BILIRUB SERPL-MCNC: 0.2 MG/DL (ref 0.2–1)
BUN SERPL-MCNC: 14 MG/DL (ref 6–20)
BUN/CREAT SERPL: 14 (ref 12–20)
CALCIUM SERPL-MCNC: 9 MG/DL (ref 8.5–10.1)
CHLORIDE SERPL-SCNC: 108 MMOL/L (ref 97–108)
CO2 SERPL-SCNC: 28 MMOL/L (ref 21–32)
COMMENT, HOLDF: NORMAL
CREAT SERPL-MCNC: 0.98 MG/DL (ref 0.55–1.02)
DIFFERENTIAL METHOD BLD: ABNORMAL
EOSINOPHIL # BLD: 0.6 K/UL (ref 0–0.4)
EOSINOPHIL NFR BLD: 4 % (ref 0–7)
ERYTHROCYTE [DISTWIDTH] IN BLOOD BY AUTOMATED COUNT: 13.2 % (ref 11.5–14.5)
GLOBULIN SER CALC-MCNC: 4.2 G/DL (ref 2–4)
GLUCOSE SERPL-MCNC: 117 MG/DL (ref 65–100)
HCT VFR BLD AUTO: 39.4 % (ref 35–47)
HGB BLD-MCNC: 12.9 G/DL (ref 11.5–16)
IMM GRANULOCYTES # BLD AUTO: 0 K/UL
IMM GRANULOCYTES NFR BLD AUTO: 0 %
INR PPP: 1 (ref 0.9–1.1)
LYMPHOCYTES # BLD: 4.6 K/UL (ref 0.8–3.5)
LYMPHOCYTES NFR BLD: 30 % (ref 12–49)
MCH RBC QN AUTO: 28.7 PG (ref 26–34)
MCHC RBC AUTO-ENTMCNC: 32.7 G/DL (ref 30–36.5)
MCV RBC AUTO: 87.8 FL (ref 80–99)
MONOCYTES # BLD: 0.6 K/UL (ref 0–1)
MONOCYTES NFR BLD: 4 % (ref 5–13)
NEUTS SEG # BLD: 9.3 K/UL (ref 1.8–8)
NEUTS SEG NFR BLD: 61 % (ref 32–75)
NRBC # BLD: 0 K/UL (ref 0–0.01)
NRBC BLD-RTO: 0 PER 100 WBC
PLATELET # BLD AUTO: 459 K/UL (ref 150–400)
PMV BLD AUTO: 9.3 FL (ref 8.9–12.9)
POTASSIUM SERPL-SCNC: 3.2 MMOL/L (ref 3.5–5.1)
PROT SERPL-MCNC: 7.8 G/DL (ref 6.4–8.2)
PROTHROMBIN TIME: 10.3 SEC (ref 9–11.1)
RBC # BLD AUTO: 4.49 M/UL (ref 3.8–5.2)
RBC MORPH BLD: ABNORMAL
SAMPLES BEING HELD,HOLD: NORMAL
SODIUM SERPL-SCNC: 139 MMOL/L (ref 136–145)
WBC # BLD AUTO: 15.3 K/UL (ref 3.6–11)
WBC MORPH BLD: ABNORMAL

## 2022-11-21 PROCEDURE — 74011250637 HC RX REV CODE- 250/637: Performed by: EMERGENCY MEDICINE

## 2022-11-21 PROCEDURE — 85025 COMPLETE CBC W/AUTO DIFF WBC: CPT

## 2022-11-21 PROCEDURE — 99285 EMERGENCY DEPT VISIT HI MDM: CPT

## 2022-11-21 PROCEDURE — 74011000636 HC RX REV CODE- 636: Performed by: RADIOLOGY

## 2022-11-21 PROCEDURE — 80053 COMPREHEN METABOLIC PANEL: CPT

## 2022-11-21 PROCEDURE — 36415 COLL VENOUS BLD VENIPUNCTURE: CPT

## 2022-11-21 PROCEDURE — 85610 PROTHROMBIN TIME: CPT

## 2022-11-21 PROCEDURE — 71275 CT ANGIOGRAPHY CHEST: CPT

## 2022-11-21 RX ORDER — DOXYCYCLINE HYCLATE 100 MG
100 TABLET ORAL 2 TIMES DAILY
Qty: 20 TABLET | Refills: 0 | Status: SHIPPED | OUTPATIENT
Start: 2022-11-21 | End: 2022-12-01

## 2022-11-21 RX ORDER — POTASSIUM CHLORIDE 750 MG/1
40 TABLET, FILM COATED, EXTENDED RELEASE ORAL
Status: COMPLETED | OUTPATIENT
Start: 2022-11-21 | End: 2022-11-21

## 2022-11-21 RX ORDER — DOXYCYCLINE HYCLATE 100 MG
100 TABLET ORAL
Status: COMPLETED | OUTPATIENT
Start: 2022-11-21 | End: 2022-11-21

## 2022-11-21 RX ADMIN — POTASSIUM CHLORIDE 40 MEQ: 750 TABLET, FILM COATED, EXTENDED RELEASE ORAL at 03:29

## 2022-11-21 RX ADMIN — DOXYCYCLINE HYCLATE 100 MG: 100 TABLET, COATED ORAL at 03:29

## 2022-11-21 RX ADMIN — IOPAMIDOL 80 ML: 755 INJECTION, SOLUTION INTRAVENOUS at 02:34

## 2022-11-21 NOTE — ED TRIAGE NOTES
She has been coughing up blood today and yesterday. She says its less than a handful but she was nervous about it. She says she does have a scratchy throat with the cough. She has a hx of PE but is not currently taking any anticoagulants.

## 2022-11-21 NOTE — DISCHARGE INSTRUCTIONS
Please take the antibiotics prescribed for the pneumonia seen on CT scan today which is likely the cause of your sputum. Please follow up with your PCP later this week for a symptom recheck. Thank you.

## 2022-11-21 NOTE — ED PROVIDER NOTES
Patient is a 59-year-old with history of bipolar disorder, GERD, hypertension, neuropathy, Takayasu's arteritis follows with rheumatology Dr. Jacob Armendariz, PEs on no current blood thinner, takes aspirin daily, splenic artery aneurysm who presents with cough with sputum with bright red streaks in it over the last 2 days. Patient reports that she feels a tickle in her throat, and has had a lot of coughing and then noticed bright red blood in her sputum. He denies any fevers, chills, nausea, vomiting. Denies any shortness of breath.        Past Medical History:   Diagnosis Date    Bipolar 1 disorder (HCC)     GERD (gastroesophageal reflux disease)     Hypertension     Neuropathy     Ovarian cyst     PE (pulmonary embolism)     Presbyesophagus     Reflux     Spinal artery aneurysm (HCC)     Takayasu's arteritis (HCC)        Past Surgical History:   Procedure Laterality Date    HX APPENDECTOMY      HX GYN      C-sections    HX GYN      ablation         Family History:   Problem Relation Age of Onset    Heart Disease Mother     Stroke Father     Heart Disease Maternal Grandmother     Stroke Paternal Grandmother     Cancer Paternal Grandmother        Social History     Socioeconomic History    Marital status:      Spouse name: Not on file    Number of children: Not on file    Years of education: Not on file    Highest education level: Not on file   Occupational History    Not on file   Tobacco Use    Smoking status: Never    Smokeless tobacco: Never   Substance and Sexual Activity    Alcohol use: No     Alcohol/week: 0.0 standard drinks    Drug use: No    Sexual activity: Yes     Partners: Male   Other Topics Concern    Not on file   Social History Narrative    Not on file     Social Determinants of Health     Financial Resource Strain: Not on file   Food Insecurity: Not on file   Transportation Needs: Not on file   Physical Activity: Not on file   Stress: Not on file   Social Connections: Not on file   Intimate Partner Violence: Not on file   Housing Stability: Not on file         ALLERGIES: Tetanus immune globulin    Review of Systems   Constitutional:  Negative for chills and fever. HENT:  Negative for drooling and nosebleeds. Eyes:  Negative for pain and itching. Respiratory:  Positive for cough. Negative for choking and stridor. Cardiovascular:  Negative for leg swelling. Gastrointestinal:  Negative for abdominal distention and rectal pain. Endocrine: Negative for heat intolerance and polyphagia. Genitourinary:  Negative for enuresis and genital sores. Musculoskeletal:  Negative for arthralgias and joint swelling. Skin:  Negative for color change. Allergic/Immunologic: Negative for immunocompromised state. Neurological:  Negative for tremors and speech difficulty. Hematological:  Negative for adenopathy. Psychiatric/Behavioral:  Negative for dysphoric mood and sleep disturbance. Vitals:    11/21/22 0028   BP: (!) 175/9   Pulse: 92   Resp: 16   Temp: 97.9 °F (36.6 °C)   SpO2: 96%            Physical Exam  Vitals and nursing note reviewed. Constitutional:       General: She is not in acute distress. Appearance: She is well-developed. She is not ill-appearing, toxic-appearing or diaphoretic. HENT:      Head: Normocephalic. Nose: Nose normal.      Mouth/Throat:      Mouth: Mucous membranes are moist.      Pharynx: No oropharyngeal exudate or posterior oropharyngeal erythema. Eyes:      Conjunctiva/sclera: Conjunctivae normal.   Cardiovascular:      Rate and Rhythm: Normal rate and regular rhythm. Pulmonary:      Effort: Pulmonary effort is normal. No respiratory distress. Breath sounds: Normal breath sounds. Abdominal:      General: There is no distension. Palpations: Abdomen is soft. Musculoskeletal:         General: No deformity. Normal range of motion. Cervical back: Normal range of motion and neck supple. Skin:     General: Skin is warm and dry. Neurological:      Mental Status: She is alert and oriented to person, place, and time. Coordination: Coordination normal.   Psychiatric:         Behavior: Behavior normal.        MDM  Number of Diagnoses or Management Options  Pneumonia of right middle lobe due to infectious organism  Diagnosis management comments: Patient nontoxic-appearing, no abnormalities in posterior oropharynx on exam.  Vital signs stable. Questionable infiltrate on CT scan, given leukocytosis and sputum that patient is describing, will cover with antibiotics. She is stable for discharge. Procedures    Patient's results have been reviewed with them. Patient and/or family have verbally conveyed their understanding and agreement of the patient's signs, symptoms, diagnosis, treatment and prognosis and additionally agree to follow up as recommended or return to the Emergency Room should their condition change prior to follow-up. Discharge instructions have also been provided to the patient with some educational information regarding their diagnosis as well a list of reasons why they would want to return to the ER prior to their follow-up appointment should their condition change.

## 2022-12-12 ENCOUNTER — HOSPITAL ENCOUNTER (EMERGENCY)
Age: 52
Discharge: HOME | End: 2022-12-12
Attending: EMERGENCY MEDICINE
Payer: COMMERCIAL

## 2022-12-12 ENCOUNTER — APPOINTMENT (OUTPATIENT)
Dept: GENERAL RADIOLOGY | Age: 52
End: 2022-12-12
Attending: EMERGENCY MEDICINE
Payer: COMMERCIAL

## 2022-12-12 VITALS
DIASTOLIC BLOOD PRESSURE: 91 MMHG | OXYGEN SATURATION: 97 % | TEMPERATURE: 98.4 F | SYSTOLIC BLOOD PRESSURE: 158 MMHG | RESPIRATION RATE: 18 BRPM | HEART RATE: 78 BPM

## 2022-12-12 DIAGNOSIS — R04.2 HEMOPTYSIS: ICD-10-CM

## 2022-12-12 DIAGNOSIS — J45.20 MILD INTERMITTENT ASTHMA WITHOUT COMPLICATION: ICD-10-CM

## 2022-12-12 DIAGNOSIS — J20.9 ACUTE BRONCHITIS, UNSPECIFIED ORGANISM: Primary | ICD-10-CM

## 2022-12-12 PROCEDURE — 71046 X-RAY EXAM CHEST 2 VIEWS: CPT

## 2022-12-12 PROCEDURE — 99283 EMERGENCY DEPT VISIT LOW MDM: CPT

## 2022-12-12 RX ORDER — ALBUTEROL SULFATE 90 UG/1
2 AEROSOL, METERED RESPIRATORY (INHALATION)
Qty: 1 EACH | Refills: 0 | Status: SHIPPED | OUTPATIENT
Start: 2022-12-12

## 2022-12-12 RX ORDER — ALBUTEROL SULFATE 2.5 MG/.5ML
2.5 SOLUTION RESPIRATORY (INHALATION)
Qty: 15 ML | Refills: 1 | Status: SHIPPED | OUTPATIENT
Start: 2022-12-12

## 2022-12-12 RX ORDER — PREDNISONE 20 MG/1
40 TABLET ORAL DAILY
Qty: 10 TABLET | Refills: 0 | Status: SHIPPED | OUTPATIENT
Start: 2022-12-12 | End: 2022-12-17

## 2022-12-12 NOTE — ED TRIAGE NOTES
Pt arrives with cc of productive cough with blood tinged phlegm. Pt seen her last week for the same issue and dx with pneumonia. Pt finished antibiotics but the problem is still ongoing.      Pt also states she hears \"whistling coming from her lungs at night:\"

## 2022-12-16 NOTE — ED PROVIDER NOTES
14-year-old female presented from home with complaint of cough and blood-tinged sputum. Patient states she been coughing for the past 3 weeks or so. She was diagnosed with pneumonia November 21 and put on antibiotics. She is also been taking cough medication and using over-the-counter cold remedies. She states she is overall she is feeling better with no more fevers or chills but she continues to have a nonproductive cough with occasional production of blood-tinged sputum. She denies any chest pain or shortness of breath. No vomiting or diarrhea. Her past medical history significant with hypertension, GERD, PE, spinal artery aneurysm. Patient is on Eliquis.        Past Medical History:   Diagnosis Date    Bipolar 1 disorder (HCC)     GERD (gastroesophageal reflux disease)     Hypertension     Neuropathy     Ovarian cyst     PE (pulmonary embolism)     Presbyesophagus     Reflux     Spinal artery aneurysm (HCC)     Takayasu's arteritis (HCC)        Past Surgical History:   Procedure Laterality Date    HX APPENDECTOMY      HX GYN      C-sections    HX GYN      ablation         Family History:   Problem Relation Age of Onset    Heart Disease Mother     Stroke Father     Heart Disease Maternal Grandmother     Stroke Paternal Grandmother     Cancer Paternal Grandmother        Social History     Socioeconomic History    Marital status:      Spouse name: Not on file    Number of children: Not on file    Years of education: Not on file    Highest education level: Not on file   Occupational History    Not on file   Tobacco Use    Smoking status: Never    Smokeless tobacco: Never   Substance and Sexual Activity    Alcohol use: No     Alcohol/week: 0.0 standard drinks    Drug use: No    Sexual activity: Yes     Partners: Male   Other Topics Concern    Not on file   Social History Narrative    Not on file     Social Determinants of Health     Financial Resource Strain: Not on file   Food Insecurity: Not on file Transportation Needs: Not on file   Physical Activity: Not on file   Stress: Not on file   Social Connections: Not on file   Intimate Partner Violence: Not on file   Housing Stability: Not on file         ALLERGIES: Tetanus immune globulin    Review of Systems   Constitutional:  Negative for fever. HENT:  Negative for facial swelling. Eyes:  Negative for visual disturbance. Respiratory:  Positive for cough. Negative for chest tightness. Cardiovascular:  Negative for chest pain. Gastrointestinal:  Negative for abdominal pain. Genitourinary:  Negative for difficulty urinating and dysuria. Musculoskeletal:  Negative for arthralgias. Skin:  Negative for rash. Neurological:  Negative for headaches. Hematological:  Negative for adenopathy. Psychiatric/Behavioral:  Negative for suicidal ideas. Vitals:    12/12/22 0053   BP: (!) 158/91   Pulse: 78   Resp: 18   Temp: 98.4 °F (36.9 °C)   SpO2: 97%            Physical Exam  Vitals and nursing note reviewed. Constitutional:       General: She is not in acute distress. Appearance: She is well-developed. HENT:      Head: Normocephalic and atraumatic. Eyes:      General: No scleral icterus. Conjunctiva/sclera: Conjunctivae normal.      Pupils: Pupils are equal, round, and reactive to light. Cardiovascular:      Rate and Rhythm: Normal rate. Heart sounds: No murmur heard. Pulmonary:      Effort: Pulmonary effort is normal. No respiratory distress. Abdominal:      General: There is no distension. Musculoskeletal:         General: Normal range of motion. Cervical back: Normal range of motion and neck supple. Skin:     General: Skin is warm and dry. Findings: No rash. Neurological:      Mental Status: She is alert and oriented to person, place, and time.         MDM  Number of Diagnoses or Management Options  Acute bronchitis, unspecified organism  Hemoptysis  Mild intermittent asthma without complication  Diagnosis management comments: Assessment: Patient here with persistent cough after recent pneumonia diagnosis and occasional blood-tinged sputum. She denies any fevers, shortness of breath, chest pain. I do not have any concern for recurrence of PE as her vital signs are normal.  No evidence of any significant bleeding. Think she stable for discharge home with continued symptomatic management. I informed her that it blood is likely coming from irritation in her airway but that does not represent any significant danger hemorrhage. She was instructed to return to the ER for bleeding should increase or she develops chest pain or shortness of breath.        Amount and/or Complexity of Data Reviewed  Tests in the radiology section of CPT®: reviewed           Procedures

## 2024-07-03 ENCOUNTER — HOSPITAL ENCOUNTER (EMERGENCY)
Facility: HOSPITAL | Age: 54
Discharge: HOME | End: 2024-07-04
Attending: EMERGENCY MEDICINE
Payer: COMMERCIAL

## 2024-07-03 DIAGNOSIS — R04.2 HEMOPTYSIS: Primary | ICD-10-CM

## 2024-07-03 PROCEDURE — 99285 EMERGENCY DEPT VISIT HI MDM: CPT

## 2024-07-03 ASSESSMENT — PAIN - FUNCTIONAL ASSESSMENT: PAIN_FUNCTIONAL_ASSESSMENT: NONE - DENIES PAIN

## 2024-07-04 ENCOUNTER — APPOINTMENT (OUTPATIENT)
Facility: HOSPITAL | Age: 54
End: 2024-07-04
Attending: EMERGENCY MEDICINE
Payer: COMMERCIAL

## 2024-07-04 VITALS
SYSTOLIC BLOOD PRESSURE: 191 MMHG | WEIGHT: 160 LBS | HEART RATE: 82 BPM | TEMPERATURE: 98.7 F | DIASTOLIC BLOOD PRESSURE: 69 MMHG | BODY MASS INDEX: 26.66 KG/M2 | RESPIRATION RATE: 18 BRPM | OXYGEN SATURATION: 97 % | HEIGHT: 65 IN

## 2024-07-04 LAB
ALBUMIN SERPL-MCNC: 4.4 G/DL (ref 3.5–5.2)
ALBUMIN/GLOB SERPL: 1.3 (ref 1.1–2.2)
ALP SERPL-CCNC: 159 U/L (ref 35–104)
ALT SERPL-CCNC: 11 U/L (ref 10–35)
ANION GAP SERPL CALC-SCNC: 11 MMOL/L (ref 5–15)
AST SERPL-CCNC: 21 U/L (ref 10–35)
BASOPHILS # BLD: 0.1 K/UL (ref 0–1)
BASOPHILS NFR BLD: 1 % (ref 0–1)
BILIRUB SERPL-MCNC: 0.2 MG/DL (ref 0.2–1)
BUN SERPL-MCNC: 6 MG/DL (ref 6–20)
BUN/CREAT SERPL: 8 (ref 12–20)
CALCIUM SERPL-MCNC: 9.8 MG/DL (ref 8.6–10)
CHLORIDE SERPL-SCNC: 102 MMOL/L (ref 98–107)
CO2 SERPL-SCNC: 28 MMOL/L (ref 22–29)
CREAT SERPL-MCNC: 0.77 MG/DL (ref 0.5–0.9)
DIFFERENTIAL METHOD BLD: ABNORMAL
EOSINOPHIL # BLD: 0.5 K/UL (ref 0–0.4)
EOSINOPHIL NFR BLD: 4 %
ERYTHROCYTE [DISTWIDTH] IN BLOOD BY AUTOMATED COUNT: 13.3 % (ref 11.5–14.5)
GLOBULIN SER CALC-MCNC: 3.5 G/DL (ref 2–4)
GLUCOSE SERPL-MCNC: 115 MG/DL (ref 65–100)
HCT VFR BLD AUTO: 41.9 % (ref 35–47)
HGB BLD-MCNC: 14 G/DL (ref 11.5–16)
IMM GRANULOCYTES # BLD AUTO: 0 K/UL (ref 0–0.04)
IMM GRANULOCYTES NFR BLD AUTO: 0 % (ref 0–0.5)
INR PPP: 0.9 (ref 0.9–1.1)
LYMPHOCYTES # BLD: 3.8 K/UL (ref 0.8–3.5)
LYMPHOCYTES NFR BLD: 28 % (ref 12–49)
MCH RBC QN AUTO: 28.5 PG (ref 26–34)
MCHC RBC AUTO-ENTMCNC: 33.4 G/DL (ref 30–36.5)
MCV RBC AUTO: 85.2 FL (ref 80–99)
MONOCYTES # BLD: 0.9 K/UL (ref 0–1)
MONOCYTES NFR BLD: 7 % (ref 5–13)
NEUTS SEG # BLD: 8 K/UL (ref 1.8–8)
NEUTS SEG NFR BLD: 60 % (ref 32–75)
NRBC # BLD: 0 K/UL (ref 0–0.01)
NRBC BLD-RTO: 0 PER 100 WBC
NT PRO BNP: 349 PG/ML
PLATELET # BLD AUTO: 407 K/UL (ref 150–400)
PMV BLD AUTO: 9 FL (ref 8.9–12.9)
POTASSIUM SERPL-SCNC: 3.4 MMOL/L (ref 3.5–5.1)
PROT SERPL-MCNC: 7.9 G/DL (ref 6.4–8.3)
PROTHROMBIN TIME: 12.6 SEC (ref 11.9–14.6)
RBC # BLD AUTO: 4.92 M/UL (ref 3.8–5.2)
SODIUM SERPL-SCNC: 141 MMOL/L (ref 136–145)
WBC # BLD AUTO: 13.3 K/UL (ref 3.6–11)

## 2024-07-04 PROCEDURE — 6360000004 HC RX CONTRAST MEDICATION: Performed by: EMERGENCY MEDICINE

## 2024-07-04 PROCEDURE — 83880 ASSAY OF NATRIURETIC PEPTIDE: CPT

## 2024-07-04 PROCEDURE — 36415 COLL VENOUS BLD VENIPUNCTURE: CPT

## 2024-07-04 PROCEDURE — 71275 CT ANGIOGRAPHY CHEST: CPT

## 2024-07-04 PROCEDURE — 6370000000 HC RX 637 (ALT 250 FOR IP): Performed by: EMERGENCY MEDICINE

## 2024-07-04 PROCEDURE — 80053 COMPREHEN METABOLIC PANEL: CPT

## 2024-07-04 PROCEDURE — 85025 COMPLETE CBC W/AUTO DIFF WBC: CPT

## 2024-07-04 PROCEDURE — 85610 PROTHROMBIN TIME: CPT

## 2024-07-04 RX ORDER — AMLODIPINE BESYLATE 10 MG/1
10 TABLET ORAL DAILY
Qty: 30 TABLET | Refills: 0 | Status: SHIPPED | OUTPATIENT
Start: 2024-07-04

## 2024-07-04 RX ORDER — LISINOPRIL 20 MG/1
20 TABLET ORAL
Status: COMPLETED | OUTPATIENT
Start: 2024-07-04 | End: 2024-07-04

## 2024-07-04 RX ORDER — AMLODIPINE BESYLATE 5 MG/1
10 TABLET ORAL
Status: COMPLETED | OUTPATIENT
Start: 2024-07-04 | End: 2024-07-04

## 2024-07-04 RX ORDER — LISINOPRIL AND HYDROCHLOROTHIAZIDE 20; 12.5 MG/1; MG/1
1 TABLET ORAL DAILY
Qty: 30 TABLET | Refills: 0 | Status: SHIPPED | OUTPATIENT
Start: 2024-07-04

## 2024-07-04 RX ADMIN — AMLODIPINE BESYLATE 10 MG: 5 TABLET ORAL at 00:07

## 2024-07-04 RX ADMIN — LISINOPRIL 20 MG: 20 TABLET ORAL at 00:07

## 2024-07-04 RX ADMIN — IOPAMIDOL 100 ML: 755 INJECTION, SOLUTION INTRAVENOUS at 00:33

## 2024-07-04 ASSESSMENT — LIFESTYLE VARIABLES
HOW MANY STANDARD DRINKS CONTAINING ALCOHOL DO YOU HAVE ON A TYPICAL DAY: 1 OR 2
HOW OFTEN DO YOU HAVE A DRINK CONTAINING ALCOHOL: MONTHLY OR LESS

## 2024-07-04 NOTE — ED NOTES
Discharge instructions reviewed, discharge papers given and pt teaching completed. (2) prescription(s) discussed. Pt instructed to follow up with PCP and Pulmonology regarding today's visit. Pt also instructed to report to ED if symptoms return, worsen, don't subside, and/or with onset of new symptoms.

## 2024-07-04 NOTE — ED PROVIDER NOTES
[facility] EMERGENCY DEPT  EMERGENCY DEPARTMENT ENCOUNTER      Pt Name: Shelley Pritchard  MRN: [id number]  Birthdate 1970  Date of evaluation: 7/3/2024  Provider: Kiran Renteria MD      HISTORY OF PRESENT ILLNESS      HPI  54-year-old with a past medical history of hypertension, PE no longer on anticoagulation, and Takayasu's arteritis presenting to the emergency department due to hemoptysis.  Symptoms initially started 2 nights ago.  Patient does endorse an occasional cough during the day and at night states that the hemoptysis only happens at night.  She took some cough medicine and due to the recurrent hemoptysis that it may have been the cough medicine that was causing this.  She did not take it tonight and had a recurrent episode.  She is not coughing up any clots.  She is not short of breath.  She denies chest pain.  No fevers or chills.  Patient currently is not taking any medications including her antihypertensives.  She denies weight loss or night sweats.  No abdominal pain.  No vomiting.      Nursing Notes were reviewed.    REVIEW OF SYSTEMS         Review of Systems  A complete review of systems was performed and all systems reviewed were negative unless otherwise document in the HPI      PAST MEDICAL HISTORY     Past Medical History:   Diagnosis Date    Bipolar 1 disorder (HCC)     GERD (gastroesophageal reflux disease)     Hypertension     Neuropathy     Ovarian cyst     PE (pulmonary embolism)     Presbyesophagus     Reflux     Spinal artery aneurysm (HCC)     Takayasu's arteritis (HCC)          SURGICAL HISTORY       Past Surgical History:   Procedure Laterality Date    APPENDECTOMY      GYN      C-sections    GYN      ablation         CURRENT MEDICATIONS       Current Discharge Medication List        CONTINUE these medications which have NOT CHANGED    Details   albuterol sulfate HFA (PROVENTIL;VENTOLIN;PROAIR) 108 (90 Base) MCG/ACT inhaler Inhale 2 puffs into the lungs every 4 hours as needed

## 2024-07-04 NOTE — ED TRIAGE NOTES
Patient arrives to ED with c/o coughing up bright red blood at night for the past two nights. Patient hypertensive on arrival.

## 2024-07-04 NOTE — DISCHARGE INSTRUCTIONS
Return to the emergency department with any new or worsening symptoms.  In the meantime I provided you information to follow-up with the pulmonologist.  Also looked at the information provided to establish care with a new primary care doctor.  Start taking your high blood pressure medication as directed as well